# Patient Record
Sex: FEMALE | Race: BLACK OR AFRICAN AMERICAN | NOT HISPANIC OR LATINO | Employment: UNEMPLOYED | URBAN - METROPOLITAN AREA
[De-identification: names, ages, dates, MRNs, and addresses within clinical notes are randomized per-mention and may not be internally consistent; named-entity substitution may affect disease eponyms.]

---

## 2018-08-31 ENCOUNTER — HOSPITAL ENCOUNTER (OUTPATIENT)
Dept: RADIOLOGY | Facility: HOSPITAL | Age: 58
Discharge: HOME | End: 2018-08-31
Attending: FAMILY MEDICINE
Payer: COMMERCIAL

## 2018-08-31 ENCOUNTER — OFFICE VISIT (OUTPATIENT)
Dept: FAMILY MEDICINE | Facility: CLINIC | Age: 58
End: 2018-08-31
Payer: COMMERCIAL

## 2018-08-31 VITALS
OXYGEN SATURATION: 97 % | RESPIRATION RATE: 17 BRPM | HEIGHT: 67 IN | SYSTOLIC BLOOD PRESSURE: 126 MMHG | BODY MASS INDEX: 37.2 KG/M2 | DIASTOLIC BLOOD PRESSURE: 82 MMHG | HEART RATE: 60 BPM | WEIGHT: 237 LBS | TEMPERATURE: 98 F

## 2018-08-31 DIAGNOSIS — G89.29 CHRONIC FOOT PAIN, RIGHT: ICD-10-CM

## 2018-08-31 DIAGNOSIS — E66.9 OBESITY (BMI 30-39.9): ICD-10-CM

## 2018-08-31 DIAGNOSIS — Z78.0 POSTMENOPAUSAL: ICD-10-CM

## 2018-08-31 DIAGNOSIS — M79.671 CHRONIC FOOT PAIN, RIGHT: ICD-10-CM

## 2018-08-31 DIAGNOSIS — R53.83 FATIGUE, UNSPECIFIED TYPE: ICD-10-CM

## 2018-08-31 DIAGNOSIS — M25.471 ANKLE SWELLING, RIGHT: ICD-10-CM

## 2018-08-31 DIAGNOSIS — I10 HYPERTENSION, UNSPECIFIED TYPE: ICD-10-CM

## 2018-08-31 PROCEDURE — 3008F BODY MASS INDEX DOCD: CPT | Mod: CPTII,S$GLB,, | Performed by: FAMILY MEDICINE

## 2018-08-31 PROCEDURE — 99204 OFFICE O/P NEW MOD 45 MIN: CPT | Mod: S$GLB,,, | Performed by: FAMILY MEDICINE

## 2018-08-31 PROCEDURE — 99999 PR PBB SHADOW E&M-NEW PATIENT-LVL IV: CPT | Mod: PBBFAC,,, | Performed by: FAMILY MEDICINE

## 2018-08-31 PROCEDURE — 73630 X-RAY EXAM OF FOOT: CPT | Mod: 26,RT,, | Performed by: RADIOLOGY

## 2018-08-31 PROCEDURE — 73630 X-RAY EXAM OF FOOT: CPT | Mod: TC,FY,PO,RT

## 2018-08-31 RX ORDER — LOSARTAN POTASSIUM AND HYDROCHLOROTHIAZIDE 12.5; 5 MG/1; MG/1
1 TABLET ORAL DAILY
COMMUNITY
Start: 2018-08-13 | End: 2024-03-15 | Stop reason: ALTCHOICE

## 2018-08-31 NOTE — PROGRESS NOTES
Randi Khan    Chief Complaint   Patient presents with    Establish Care       History of Present Illness:   Ms. Khan comes in today to establish care with me.  She has taken medication today and is not fasting.  She states she has been previously followed by PCP Dr. Moris Mae with physical performed earlier this year.  She states she exercises 2 times a week and monitors her diet.  She states she tries to avoid eating carbs as she seems to get really tired after eating them.    She states she often awakens tired despite sleeping okay.    She reports having chronic right ankle and foot swelling which resolves with overnight elevation.  She also reports having chronic, occasional pain at her inner right ankle and bottom of her right foot.  She states she does not like to take medication for pain; she states she walks and symptoms improved.  She reports having trauma as a child; she states she wore clogs and remembers twisting her ankle.  Otherwise, she reports no new trauma but mentions in July 2018 while in [place] she walked a lot and subsequently had swelling, redness, and pain of her right great toe with gradual improvement noted.    Otherwise, she denies having fever, chills, appetite changes; shortness of breath, cough, wheezing; chest pain, palpitations, leg swelling; abdominal pain, nausea, vomiting, diarrhea, constipation; hot or cold intolerance; unusual urinary symptoms; back pain; headache; anxiety, depression, homicidal or suicidal thoughts.    Past Medical History:  No date: History of gastroesophageal reflux (GERD)  No date: History of positive PPD      Comment:  Not treated; Family history of TB  1998: HTN (hypertension)      Comment:  Diagnosed following pregnancy; follows with cardiology at [facility]  No date: Postmenopausal        Current Outpatient Medications   Medication Sig    losartan-hydrochlorothiazide 50-12.5 mg (HYZAAR) 50-12.5 mg per tablet Take 1 tablet by  mouth once daily.        Review of Systems   Constitutional: Positive for fatigue. Negative for activity change, appetite change, chills and fever.   Eyes:        Wears glasses.   Respiratory: Negative for cough, shortness of breath and wheezing.    Cardiovascular: Negative for chest pain, palpitations and leg swelling.   Gastrointestinal: Negative for abdominal pain, constipation, diarrhea, nausea and vomiting.   Endocrine: Negative for cold intolerance and heat intolerance.   Genitourinary: Negative for difficulty urinating.   Musculoskeletal: Positive for joint swelling and myalgias. Negative for back pain.        See history of present illness.   Neurological: Negative for headaches.   Psychiatric/Behavioral: Negative for dysphoric mood and suicidal ideas. The patient is not nervous/anxious.         Negative for homicidal ideas.       Objective:  Physical Exam   Constitutional: She is oriented to person, place, and time. She appears well-developed and well-nourished. No distress.   Pleasant.   Eyes:   She wears glasses.   Neck: Normal range of motion. Neck supple. No thyromegaly present.   Cardiovascular: Normal rate, regular rhythm, normal heart sounds and intact distal pulses.   No murmur heard.  Pulmonary/Chest: Effort normal and breath sounds normal. No stridor. No respiratory distress. She has no wheezes.   Abdominal: Soft. Bowel sounds are normal. She exhibits no distension and no mass. There is no tenderness. There is no guarding.   Musculoskeletal: Normal range of motion. She exhibits edema. She exhibits no tenderness.   She is ambulatory without problems. Negative Domingo's sign at both lower legs.  Trace edema at right ankle and foot without tenderness noted.   Lymphadenopathy:     She has no cervical adenopathy.   Neurological: She is alert and oriented to person, place, and time.   Skin: She is not diaphoretic.   Psychiatric: She has a normal mood and affect. Her behavior is normal. Judgment and  thought content normal.   Vitals reviewed.      ASSESSMENT:  1. Hypertension, unspecified type    2. Chronic foot pain, right    3. Ankle swelling, right    4. Fatigue, unspecified type    5. Postmenopausal    6. Obesity (BMI 30-39.9)        PLAN:  Randi was seen today for establish care.    Diagnoses and all orders for this visit:    Hypertension, unspecified type  -     Comprehensive metabolic panel; Future  -     Uric acid; Future  -     TSH; Future  -     CBC auto differential; Future  -     Hemoglobin A1c; Future    Chronic foot pain, right  -     X-Ray Foot Complete Right; Future  -     Uric acid; Future    Ankle swelling, right  -     X-Ray Foot Complete Right; Future  -     Uric acid; Future    Fatigue, unspecified type  -     Vitamin D; Future    Postmenopausal    Obesity (BMI 30-39.9)       Patient advised to call for results.  Continue current medications, follow low sodium, low cholesterol, low carb diet, daily walks.  Keep follow up with specialist.  Flu shot this fall.  Follow-up in about 8 months (around 4/30/2019) for physical.

## 2018-09-05 DIAGNOSIS — E79.0 HYPERURICEMIA: Primary | ICD-10-CM

## 2018-09-05 RX ORDER — ALLOPURINOL 100 MG/1
100 TABLET ORAL DAILY
Qty: 30 TABLET | Refills: 2 | Status: SHIPPED | OUTPATIENT
Start: 2018-09-05 | End: 2018-12-23 | Stop reason: SDUPTHER

## 2018-09-18 ENCOUNTER — PATIENT MESSAGE (OUTPATIENT)
Dept: FAMILY MEDICINE | Facility: CLINIC | Age: 58
End: 2018-09-18

## 2018-09-19 ENCOUNTER — TELEPHONE (OUTPATIENT)
Dept: FAMILY MEDICINE | Facility: CLINIC | Age: 58
End: 2018-09-19

## 2018-09-19 NOTE — TELEPHONE ENCOUNTER
I have a question about URIC ACID resulted on 8/31/18, 9:11 PM.     Could the high uric acid be a side effect of the Lasartan

## 2018-09-19 NOTE — TELEPHONE ENCOUNTER
Advise pt  The fluid portion part (hydrochlorothiazide) of blood pressure medication (Losartan-hydrochlorothiazide) may cause elevation of uric acid. If you desire blood pressure medication change, I recommend you continue Losartan and hydrochlorothiazide can be changed to another fluid medication which does not cause uric acid elevation. Whether or not you decide to take Allopurinol as I recommended, please make sure to call nurse for appointment with see me in 2 months as I need to recheck you.  Thanks.

## 2018-12-23 DIAGNOSIS — E79.0 HYPERURICEMIA: ICD-10-CM

## 2018-12-24 RX ORDER — ALLOPURINOL 100 MG/1
TABLET ORAL
Qty: 30 TABLET | Refills: 0 | Status: SHIPPED | OUTPATIENT
Start: 2018-12-24 | End: 2019-02-16 | Stop reason: SDUPTHER

## 2019-01-22 ENCOUNTER — OFFICE VISIT (OUTPATIENT)
Dept: FAMILY MEDICINE | Facility: CLINIC | Age: 59
End: 2019-01-22
Payer: COMMERCIAL

## 2019-01-22 VITALS
BODY MASS INDEX: 37.65 KG/M2 | HEART RATE: 65 BPM | WEIGHT: 239.88 LBS | RESPIRATION RATE: 16 BRPM | DIASTOLIC BLOOD PRESSURE: 80 MMHG | HEIGHT: 67 IN | OXYGEN SATURATION: 100 % | TEMPERATURE: 98 F | SYSTOLIC BLOOD PRESSURE: 134 MMHG

## 2019-01-22 DIAGNOSIS — R20.2 NUMBNESS AND TINGLING OF RIGHT UPPER EXTREMITY: ICD-10-CM

## 2019-01-22 DIAGNOSIS — R20.0 NUMBNESS AND TINGLING OF RIGHT UPPER EXTREMITY: ICD-10-CM

## 2019-01-22 DIAGNOSIS — M25.60 JOINT STIFFNESS: ICD-10-CM

## 2019-01-22 DIAGNOSIS — E55.9 VITAMIN D DEFICIENCY: ICD-10-CM

## 2019-01-22 DIAGNOSIS — E79.0 ELEVATED URIC ACID IN BLOOD: Primary | ICD-10-CM

## 2019-01-22 PROCEDURE — 3075F SYST BP GE 130 - 139MM HG: CPT | Mod: CPTII,S$GLB,, | Performed by: FAMILY MEDICINE

## 2019-01-22 PROCEDURE — 99214 OFFICE O/P EST MOD 30 MIN: CPT | Mod: S$GLB,,, | Performed by: FAMILY MEDICINE

## 2019-01-22 PROCEDURE — 99214 PR OFFICE/OUTPT VISIT, EST, LEVL IV, 30-39 MIN: ICD-10-PCS | Mod: S$GLB,,, | Performed by: FAMILY MEDICINE

## 2019-01-22 PROCEDURE — 99999 PR PBB SHADOW E&M-EST. PATIENT-LVL III: ICD-10-PCS | Mod: PBBFAC,,, | Performed by: FAMILY MEDICINE

## 2019-01-22 PROCEDURE — 99999 PR PBB SHADOW E&M-EST. PATIENT-LVL III: CPT | Mod: PBBFAC,,, | Performed by: FAMILY MEDICINE

## 2019-01-22 PROCEDURE — 3079F DIAST BP 80-89 MM HG: CPT | Mod: CPTII,S$GLB,, | Performed by: FAMILY MEDICINE

## 2019-01-22 PROCEDURE — 3008F BODY MASS INDEX DOCD: CPT | Mod: CPTII,S$GLB,, | Performed by: FAMILY MEDICINE

## 2019-01-22 PROCEDURE — 3079F PR MOST RECENT DIASTOLIC BLOOD PRESSURE 80-89 MM HG: ICD-10-PCS | Mod: CPTII,S$GLB,, | Performed by: FAMILY MEDICINE

## 2019-01-22 PROCEDURE — 3075F PR MOST RECENT SYSTOLIC BLOOD PRESS GE 130-139MM HG: ICD-10-PCS | Mod: CPTII,S$GLB,, | Performed by: FAMILY MEDICINE

## 2019-01-22 PROCEDURE — 3008F PR BODY MASS INDEX (BMI) DOCUMENTED: ICD-10-PCS | Mod: CPTII,S$GLB,, | Performed by: FAMILY MEDICINE

## 2019-01-22 RX ORDER — CHOLECALCIFEROL (VITAMIN D3) 50 MCG
TABLET ORAL 2 TIMES DAILY
COMMUNITY
End: 2023-10-03
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
## 2019-01-24 ENCOUNTER — TELEPHONE (OUTPATIENT)
Dept: FAMILY MEDICINE | Facility: CLINIC | Age: 59
End: 2019-01-24

## 2019-01-24 ENCOUNTER — APPOINTMENT (OUTPATIENT)
Dept: LAB | Facility: HOSPITAL | Age: 59
End: 2019-01-24
Attending: FAMILY MEDICINE
Payer: COMMERCIAL

## 2019-01-24 DIAGNOSIS — M25.60 JOINT STIFFNESS: ICD-10-CM

## 2019-01-24 DIAGNOSIS — R20.2 NUMBNESS AND TINGLING OF RIGHT UPPER EXTREMITY: ICD-10-CM

## 2019-01-24 DIAGNOSIS — E79.0 ELEVATED URIC ACID IN BLOOD: Primary | ICD-10-CM

## 2019-01-24 DIAGNOSIS — R20.0 NUMBNESS AND TINGLING OF RIGHT UPPER EXTREMITY: ICD-10-CM

## 2019-01-24 DIAGNOSIS — E55.9 VITAMIN D DEFICIENCY: ICD-10-CM

## 2019-02-13 ENCOUNTER — PATIENT MESSAGE (OUTPATIENT)
Dept: FAMILY MEDICINE | Facility: CLINIC | Age: 59
End: 2019-02-13

## 2019-02-16 DIAGNOSIS — E79.0 HYPERURICEMIA: ICD-10-CM

## 2019-02-18 RX ORDER — ALLOPURINOL 100 MG/1
TABLET ORAL
Qty: 30 TABLET | Refills: 5 | Status: SHIPPED | OUTPATIENT
Start: 2019-02-18 | End: 2019-10-01 | Stop reason: SDUPTHER

## 2019-02-23 ENCOUNTER — PATIENT MESSAGE (OUTPATIENT)
Dept: FAMILY MEDICINE | Facility: CLINIC | Age: 59
End: 2019-02-23

## 2019-05-24 ENCOUNTER — OFFICE VISIT (OUTPATIENT)
Dept: FAMILY MEDICINE | Facility: CLINIC | Age: 59
End: 2019-05-24
Payer: COMMERCIAL

## 2019-05-24 VITALS
RESPIRATION RATE: 17 BRPM | WEIGHT: 243.19 LBS | HEART RATE: 74 BPM | OXYGEN SATURATION: 98 % | BODY MASS INDEX: 38.17 KG/M2 | TEMPERATURE: 97 F | HEIGHT: 67 IN | SYSTOLIC BLOOD PRESSURE: 128 MMHG | DIASTOLIC BLOOD PRESSURE: 82 MMHG

## 2019-05-24 DIAGNOSIS — E66.9 OBESITY (BMI 30-39.9): ICD-10-CM

## 2019-05-24 DIAGNOSIS — E55.9 VITAMIN D DEFICIENCY: ICD-10-CM

## 2019-05-24 DIAGNOSIS — M19.079 ARTHRITIS OF FOOT: ICD-10-CM

## 2019-05-24 DIAGNOSIS — Z12.31 VISIT FOR SCREENING MAMMOGRAM: ICD-10-CM

## 2019-05-24 DIAGNOSIS — Z78.0 POSTMENOPAUSAL: ICD-10-CM

## 2019-05-24 DIAGNOSIS — Z12.11 SCREENING FOR COLON CANCER: ICD-10-CM

## 2019-05-24 DIAGNOSIS — Z00.00 ANNUAL PHYSICAL EXAM: Primary | ICD-10-CM

## 2019-05-24 DIAGNOSIS — I10 HYPERTENSION, UNSPECIFIED TYPE: ICD-10-CM

## 2019-05-24 LAB
BACTERIA #/AREA URNS AUTO: ABNORMAL /HPF
BILIRUB UR QL STRIP: NEGATIVE
CLARITY UR REFRACT.AUTO: CLEAR
COLOR UR AUTO: YELLOW
GLUCOSE UR QL STRIP: NEGATIVE
HGB UR QL STRIP: NEGATIVE
KETONES UR QL STRIP: NEGATIVE
LEUKOCYTE ESTERASE UR QL STRIP: ABNORMAL
MICROSCOPIC COMMENT: ABNORMAL
NITRITE UR QL STRIP: NEGATIVE
PH UR STRIP: 7 [PH] (ref 5–8)
PROT UR QL STRIP: NEGATIVE
RBC #/AREA URNS AUTO: 0 /HPF (ref 0–4)
SP GR UR STRIP: 1.01 (ref 1–1.03)
SQUAMOUS #/AREA URNS AUTO: 1 /HPF
URN SPEC COLLECT METH UR: ABNORMAL
WBC #/AREA URNS AUTO: 11 /HPF (ref 0–5)

## 2019-05-24 PROCEDURE — 3079F DIAST BP 80-89 MM HG: CPT | Mod: CPTII,S$GLB,, | Performed by: FAMILY MEDICINE

## 2019-05-24 PROCEDURE — 3079F PR MOST RECENT DIASTOLIC BLOOD PRESSURE 80-89 MM HG: ICD-10-PCS | Mod: CPTII,S$GLB,, | Performed by: FAMILY MEDICINE

## 2019-05-24 PROCEDURE — 99396 PREV VISIT EST AGE 40-64: CPT | Mod: S$GLB,,, | Performed by: FAMILY MEDICINE

## 2019-05-24 PROCEDURE — 99999 PR PBB SHADOW E&M-EST. PATIENT-LVL IV: ICD-10-PCS | Mod: PBBFAC,,, | Performed by: FAMILY MEDICINE

## 2019-05-24 PROCEDURE — 99999 PR PBB SHADOW E&M-EST. PATIENT-LVL IV: CPT | Mod: PBBFAC,,, | Performed by: FAMILY MEDICINE

## 2019-05-24 PROCEDURE — 99396 PR PREVENTIVE VISIT,EST,40-64: ICD-10-PCS | Mod: S$GLB,,, | Performed by: FAMILY MEDICINE

## 2019-05-24 PROCEDURE — 88175 CYTOPATH C/V AUTO FLUID REDO: CPT

## 2019-05-24 PROCEDURE — 3074F SYST BP LT 130 MM HG: CPT | Mod: CPTII,S$GLB,, | Performed by: FAMILY MEDICINE

## 2019-05-24 PROCEDURE — 81001 URINALYSIS AUTO W/SCOPE: CPT

## 2019-05-24 PROCEDURE — 3074F PR MOST RECENT SYSTOLIC BLOOD PRESSURE < 130 MM HG: ICD-10-PCS | Mod: CPTII,S$GLB,, | Performed by: FAMILY MEDICINE

## 2019-05-27 ENCOUNTER — TELEPHONE (OUTPATIENT)
Dept: GASTROENTEROLOGY | Facility: CLINIC | Age: 59
End: 2019-05-27

## 2019-05-27 ENCOUNTER — LAB VISIT (OUTPATIENT)
Dept: LAB | Facility: HOSPITAL | Age: 59
End: 2019-05-27
Attending: FAMILY MEDICINE
Payer: COMMERCIAL

## 2019-05-27 DIAGNOSIS — Z00.00 ANNUAL PHYSICAL EXAM: ICD-10-CM

## 2019-05-27 LAB
25(OH)D3+25(OH)D2 SERPL-MCNC: 22 NG/ML (ref 30–96)
ALBUMIN SERPL BCP-MCNC: 3.8 G/DL (ref 3.5–5.2)
ALP SERPL-CCNC: 89 U/L (ref 55–135)
ALT SERPL W/O P-5'-P-CCNC: 24 U/L (ref 10–44)
ANION GAP SERPL CALC-SCNC: 9 MMOL/L (ref 8–16)
AST SERPL-CCNC: 21 U/L (ref 10–40)
BASOPHILS # BLD AUTO: 0.03 K/UL (ref 0–0.2)
BASOPHILS NFR BLD: 0.5 % (ref 0–1.9)
BILIRUB SERPL-MCNC: 0.4 MG/DL (ref 0.1–1)
BUN SERPL-MCNC: 18 MG/DL (ref 6–20)
CALCIUM SERPL-MCNC: 10.5 MG/DL (ref 8.7–10.5)
CHLORIDE SERPL-SCNC: 107 MMOL/L (ref 95–110)
CHOLEST SERPL-MCNC: 111 MG/DL (ref 120–199)
CHOLEST/HDLC SERPL: 2.4 {RATIO} (ref 2–5)
CO2 SERPL-SCNC: 25 MMOL/L (ref 23–29)
CREAT SERPL-MCNC: 1 MG/DL (ref 0.5–1.4)
DIFFERENTIAL METHOD: ABNORMAL
EOSINOPHIL # BLD AUTO: 0.2 K/UL (ref 0–0.5)
EOSINOPHIL NFR BLD: 3.7 % (ref 0–8)
ERYTHROCYTE [DISTWIDTH] IN BLOOD BY AUTOMATED COUNT: 13.1 % (ref 11.5–14.5)
EST. GFR  (AFRICAN AMERICAN): >60 ML/MIN/1.73 M^2
EST. GFR  (NON AFRICAN AMERICAN): >60 ML/MIN/1.73 M^2
GLUCOSE SERPL-MCNC: 75 MG/DL (ref 70–110)
HCT VFR BLD AUTO: 36.3 % (ref 37–48.5)
HDLC SERPL-MCNC: 47 MG/DL (ref 40–75)
HDLC SERPL: 42.3 % (ref 20–50)
HGB BLD-MCNC: 11.1 G/DL (ref 12–16)
IMM GRANULOCYTES # BLD AUTO: 0.02 K/UL (ref 0–0.04)
IMM GRANULOCYTES NFR BLD AUTO: 0.3 % (ref 0–0.5)
LDLC SERPL CALC-MCNC: 55.6 MG/DL (ref 63–159)
LYMPHOCYTES # BLD AUTO: 3.9 K/UL (ref 1–4.8)
LYMPHOCYTES NFR BLD: 60.2 % (ref 18–48)
MCH RBC QN AUTO: 29.8 PG (ref 27–31)
MCHC RBC AUTO-ENTMCNC: 30.6 G/DL (ref 32–36)
MCV RBC AUTO: 98 FL (ref 82–98)
MONOCYTES # BLD AUTO: 0.5 K/UL (ref 0.3–1)
MONOCYTES NFR BLD: 7.3 % (ref 4–15)
NEUTROPHILS # BLD AUTO: 1.8 K/UL (ref 1.8–7.7)
NEUTROPHILS NFR BLD: 28 % (ref 38–73)
NONHDLC SERPL-MCNC: 64 MG/DL
NRBC BLD-RTO: 0 /100 WBC
PLATELET # BLD AUTO: 221 K/UL (ref 150–350)
PMV BLD AUTO: 10.7 FL (ref 9.2–12.9)
POTASSIUM SERPL-SCNC: 4.4 MMOL/L (ref 3.5–5.1)
PROT SERPL-MCNC: 6.8 G/DL (ref 6–8.4)
RBC # BLD AUTO: 3.72 M/UL (ref 4–5.4)
SODIUM SERPL-SCNC: 141 MMOL/L (ref 136–145)
TRIGL SERPL-MCNC: 42 MG/DL (ref 30–150)
TSH SERPL DL<=0.005 MIU/L-ACNC: 1.25 UIU/ML (ref 0.4–4)
WBC # BLD AUTO: 6.48 K/UL (ref 3.9–12.7)

## 2019-05-27 PROCEDURE — 86803 HEPATITIS C AB TEST: CPT

## 2019-05-27 PROCEDURE — 84443 ASSAY THYROID STIM HORMONE: CPT

## 2019-05-27 PROCEDURE — 36415 COLL VENOUS BLD VENIPUNCTURE: CPT | Mod: PO

## 2019-05-27 PROCEDURE — 80053 COMPREHEN METABOLIC PANEL: CPT

## 2019-05-27 PROCEDURE — 82306 VITAMIN D 25 HYDROXY: CPT

## 2019-05-27 PROCEDURE — 85025 COMPLETE CBC W/AUTO DIFF WBC: CPT

## 2019-05-27 PROCEDURE — 80061 LIPID PANEL: CPT

## 2019-05-28 LAB — HCV AB SERPL QL IA: NEGATIVE

## 2019-06-11 DIAGNOSIS — N39.0 URINARY TRACT INFECTION WITHOUT HEMATURIA, SITE UNSPECIFIED: Primary | ICD-10-CM

## 2019-06-11 RX ORDER — SULFAMETHOXAZOLE AND TRIMETHOPRIM 800; 160 MG/1; MG/1
1 TABLET ORAL 2 TIMES DAILY
Qty: 10 TABLET | Refills: 0 | Status: SHIPPED | OUTPATIENT
Start: 2019-06-11 | End: 2019-06-16

## 2019-09-23 ENCOUNTER — TELEPHONE (OUTPATIENT)
Dept: ENDOSCOPY | Facility: HOSPITAL | Age: 59
End: 2019-09-23

## 2019-09-23 NOTE — TELEPHONE ENCOUNTER
Attempted to schedule procedure with patient, no answer and unable to leave a message per recording.

## 2019-10-01 DIAGNOSIS — E79.0 HYPERURICEMIA: ICD-10-CM

## 2019-10-01 RX ORDER — ALLOPURINOL 100 MG/1
TABLET ORAL
Qty: 30 TABLET | Refills: 5 | Status: SHIPPED | OUTPATIENT
Start: 2019-10-01 | End: 2020-06-19

## 2019-10-23 ENCOUNTER — TELEPHONE (OUTPATIENT)
Dept: ENDOSCOPY | Facility: HOSPITAL | Age: 59
End: 2019-10-23

## 2019-11-25 ENCOUNTER — OFFICE VISIT (OUTPATIENT)
Dept: FAMILY MEDICINE | Facility: CLINIC | Age: 59
End: 2019-11-25
Payer: COMMERCIAL

## 2019-11-25 VITALS
TEMPERATURE: 98 F | HEIGHT: 67 IN | RESPIRATION RATE: 16 BRPM | SYSTOLIC BLOOD PRESSURE: 139 MMHG | BODY MASS INDEX: 37.82 KG/M2 | WEIGHT: 240.94 LBS | HEART RATE: 60 BPM | DIASTOLIC BLOOD PRESSURE: 77 MMHG

## 2019-11-25 DIAGNOSIS — E66.9 OBESITY (BMI 30-39.9): ICD-10-CM

## 2019-11-25 DIAGNOSIS — E55.9 VITAMIN D DEFICIENCY: ICD-10-CM

## 2019-11-25 DIAGNOSIS — I10 HYPERTENSION, UNSPECIFIED TYPE: Primary | ICD-10-CM

## 2019-11-25 PROCEDURE — 99999 PR PBB SHADOW E&M-EST. PATIENT-LVL III: ICD-10-PCS | Mod: PBBFAC,,, | Performed by: FAMILY MEDICINE

## 2019-11-25 PROCEDURE — 99214 PR OFFICE/OUTPT VISIT, EST, LEVL IV, 30-39 MIN: ICD-10-PCS | Mod: S$GLB,,, | Performed by: FAMILY MEDICINE

## 2019-11-25 PROCEDURE — 99214 OFFICE O/P EST MOD 30 MIN: CPT | Mod: S$GLB,,, | Performed by: FAMILY MEDICINE

## 2019-11-25 PROCEDURE — 3075F SYST BP GE 130 - 139MM HG: CPT | Mod: CPTII,S$GLB,, | Performed by: FAMILY MEDICINE

## 2019-11-25 PROCEDURE — 3078F PR MOST RECENT DIASTOLIC BLOOD PRESSURE < 80 MM HG: ICD-10-PCS | Mod: CPTII,S$GLB,, | Performed by: FAMILY MEDICINE

## 2019-11-25 PROCEDURE — 3008F BODY MASS INDEX DOCD: CPT | Mod: CPTII,S$GLB,, | Performed by: FAMILY MEDICINE

## 2019-11-25 PROCEDURE — 3078F DIAST BP <80 MM HG: CPT | Mod: CPTII,S$GLB,, | Performed by: FAMILY MEDICINE

## 2019-11-25 PROCEDURE — 3075F PR MOST RECENT SYSTOLIC BLOOD PRESS GE 130-139MM HG: ICD-10-PCS | Mod: CPTII,S$GLB,, | Performed by: FAMILY MEDICINE

## 2019-11-25 PROCEDURE — 3008F PR BODY MASS INDEX (BMI) DOCUMENTED: ICD-10-PCS | Mod: CPTII,S$GLB,, | Performed by: FAMILY MEDICINE

## 2019-11-25 PROCEDURE — 99999 PR PBB SHADOW E&M-EST. PATIENT-LVL III: CPT | Mod: PBBFAC,,, | Performed by: FAMILY MEDICINE

## 2019-11-25 NOTE — PROGRESS NOTES
Randi Khan    Chief Complaint   Patient presents with    Hypertension    Follow-up       History of Present Illness:   Ms. Khan comes in today for 6-month hypertension follow-up.  She has taken medication today and is not fasting.  She states she does not perform home blood pressure checks.  She states she sometimes monitors her diet but has not been regularly exercising.    She reports having occasional hand numbness, tingling with cold fingers but states she warms her fingers up and they feel much better.    She states she has intermittent swelling and pain at right great toe with only slight swelling without pain today; she states she takes Allopurinol with help.    Otherwise, she states she feels okay today and denies having fever, chills, fatigue, appetite changes; shortness of breath, cough, wheezing; chest pain, palpitations, leg swelling; abdominal pain, nausea, vomiting, diarrhea, constipation; unusual urinary symptoms; back pain; headache; anxiety, depression, homicidal or suicidal thoughts.    She states she follows with cardiologist Dr. Traci Lombardo every 6 to 8 months for hypertension follow up.    Labs:              WBC                      6.48                05/27/2019                 HGB                      11.1 (L)            05/27/2019                 HCT                      36.3 (L)            05/27/2019                 PLT                      221                 05/27/2019                 CHOL                     111 (L)             05/27/2019                 TRIG                     42                  05/27/2019                 HDL                      47                  05/27/2019                 ALT                      24                  05/27/2019                 AST                      21                  05/27/2019                 NA                       141                 05/27/2019                 K                        4.4                 05/27/2019                  CL                       107                 05/27/2019                 CREATININE               1.0                 05/27/2019                 BUN                      18                  05/27/2019                 CO2                      25                  05/27/2019                 TSH                      1.252               05/27/2019                 HGBA1C                   5.6                 08/31/2018                LDLCALC                  55.6 (L)            05/27/2019            Vit D, 25-Hydroxy             22         05/27/2019       JOSE A Screen                     Negative <1:160    01/24/2019          Current Outpatient Medications   Medication Sig    allopurinol (ZYLOPRIM) 100 MG tablet TAKE 1 TABLET(100 MG) BY MOUTH EVERY DAY    cholecalciferol, vitamin D3, (VITAMIN D3) 2,000 unit Tab Take by mouth once daily.    losartan-hydrochlorothiazide 50-12.5 mg (HYZAAR) 50-12.5 mg per tablet Take 1 tablet by mouth once daily.        Review of Systems   Constitutional: Negative for activity change, appetite change, chills, fatigue and fever.        Weight 110.3 kg (243 lb 2.7 oz) at May 24, 2019 visit.   Eyes:        Wears glasses.   Respiratory: Negative for cough, shortness of breath and wheezing.    Cardiovascular: Negative for chest pain, palpitations and leg swelling.        See history of present illness.   Gastrointestinal: Negative for abdominal pain, constipation, diarrhea, nausea and vomiting.   Endocrine: Negative for cold intolerance and heat intolerance.   Genitourinary: Negative for difficulty urinating.   Musculoskeletal: Positive for joint swelling. Negative for back pain and myalgias.        See history of present illness.   Neurological: Positive for numbness. Negative for headaches.   Psychiatric/Behavioral: Negative for dysphoric mood and suicidal ideas. The patient is not nervous/anxious.         Negative for homicidal ideas.       Objective:  Physical Exam   Constitutional: She is  oriented to person, place, and time. She appears well-developed and well-nourished. No distress.   Pleasant.   Eyes:   She wears glasses.   Neck: Normal range of motion. Neck supple. No thyromegaly present.   Cardiovascular: Normal rate, regular rhythm, normal heart sounds and intact distal pulses.   No murmur heard.  Pulmonary/Chest: Effort normal and breath sounds normal. No stridor. No respiratory distress. She has no wheezes.   Abdominal: Soft. Bowel sounds are normal. She exhibits no distension and no mass. There is no tenderness. There is no guarding.   Musculoskeletal: Normal range of motion. She exhibits no edema or tenderness.   She is ambulatory without problems.    Lymphadenopathy:     She has no cervical adenopathy.   Neurological: She is alert and oriented to person, place, and time.   Skin: She is not diaphoretic.   Psychiatric: She has a normal mood and affect. Her behavior is normal. Judgment and thought content normal.   Vitals reviewed.      ASSESSMENT:  1. Hypertension, unspecified type    2. Vitamin D deficiency    3. Obesity (BMI 30-39.9)        PLAN:  Randi was seen today for hypertension and follow-up.    Diagnoses and all orders for this visit:    Hypertension, unspecified type    Vitamin D deficiency    Obesity (BMI 30-39.9)       Continue current medications, follow low sodium, low cholesterol, low carb diet, daily walks.  Keep follow up with specialists.  I discussed with patient possibility she has Raynaud's; however, she declines medication but states she will continue symptomatic-conservative treatment (wear gloves).  Patient declines flu shot today.  Follow up in about 6 months (around 6/2/2020) for physical.

## 2020-02-28 ENCOUNTER — PATIENT MESSAGE (OUTPATIENT)
Dept: FAMILY MEDICINE | Facility: CLINIC | Age: 60
End: 2020-02-28

## 2020-03-01 ENCOUNTER — PATIENT MESSAGE (OUTPATIENT)
Dept: FAMILY MEDICINE | Facility: CLINIC | Age: 60
End: 2020-03-01

## 2020-03-03 ENCOUNTER — OFFICE VISIT (OUTPATIENT)
Dept: FAMILY MEDICINE | Facility: CLINIC | Age: 60
End: 2020-03-03
Payer: COMMERCIAL

## 2020-03-03 VITALS
HEART RATE: 68 BPM | HEIGHT: 67 IN | SYSTOLIC BLOOD PRESSURE: 138 MMHG | DIASTOLIC BLOOD PRESSURE: 76 MMHG | WEIGHT: 229.75 LBS | TEMPERATURE: 98 F | RESPIRATION RATE: 16 BRPM | BODY MASS INDEX: 36.06 KG/M2 | OXYGEN SATURATION: 94 %

## 2020-03-03 DIAGNOSIS — I10 HYPERTENSION, UNSPECIFIED TYPE: ICD-10-CM

## 2020-03-03 DIAGNOSIS — F32.A ANXIETY AND DEPRESSION: Primary | ICD-10-CM

## 2020-03-03 DIAGNOSIS — Z78.0 POSTMENOPAUSAL: ICD-10-CM

## 2020-03-03 DIAGNOSIS — F41.9 ANXIETY AND DEPRESSION: Primary | ICD-10-CM

## 2020-03-03 PROCEDURE — 3008F PR BODY MASS INDEX (BMI) DOCUMENTED: ICD-10-PCS | Mod: CPTII,S$GLB,, | Performed by: FAMILY MEDICINE

## 2020-03-03 PROCEDURE — 99999 PR PBB SHADOW E&M-EST. PATIENT-LVL III: ICD-10-PCS | Mod: PBBFAC,,, | Performed by: FAMILY MEDICINE

## 2020-03-03 PROCEDURE — 99214 PR OFFICE/OUTPT VISIT, EST, LEVL IV, 30-39 MIN: ICD-10-PCS | Mod: S$GLB,,, | Performed by: FAMILY MEDICINE

## 2020-03-03 PROCEDURE — 3075F PR MOST RECENT SYSTOLIC BLOOD PRESS GE 130-139MM HG: ICD-10-PCS | Mod: CPTII,S$GLB,, | Performed by: FAMILY MEDICINE

## 2020-03-03 PROCEDURE — 99214 OFFICE O/P EST MOD 30 MIN: CPT | Mod: S$GLB,,, | Performed by: FAMILY MEDICINE

## 2020-03-03 PROCEDURE — 3078F DIAST BP <80 MM HG: CPT | Mod: CPTII,S$GLB,, | Performed by: FAMILY MEDICINE

## 2020-03-03 PROCEDURE — 3075F SYST BP GE 130 - 139MM HG: CPT | Mod: CPTII,S$GLB,, | Performed by: FAMILY MEDICINE

## 2020-03-03 PROCEDURE — 99999 PR PBB SHADOW E&M-EST. PATIENT-LVL III: CPT | Mod: PBBFAC,,, | Performed by: FAMILY MEDICINE

## 2020-03-03 PROCEDURE — 3078F PR MOST RECENT DIASTOLIC BLOOD PRESSURE < 80 MM HG: ICD-10-PCS | Mod: CPTII,S$GLB,, | Performed by: FAMILY MEDICINE

## 2020-03-03 PROCEDURE — 3008F BODY MASS INDEX DOCD: CPT | Mod: CPTII,S$GLB,, | Performed by: FAMILY MEDICINE

## 2020-03-03 RX ORDER — VENLAFAXINE HYDROCHLORIDE 75 MG/1
75 CAPSULE, EXTENDED RELEASE ORAL DAILY
Qty: 30 CAPSULE | Refills: 2 | Status: SHIPPED | OUTPATIENT
Start: 2020-03-03 | End: 2020-03-23 | Stop reason: ALTCHOICE

## 2020-03-03 NOTE — PROGRESS NOTES
Randi Khan    Chief Complaint   Patient presents with    Mood changes       History of Present Illness:   Ms. Khan comes in today stating for the last few months (since August 2019) she has been experiencing fluctuating moods (down been normal) which she states is unusual for her.  She states she sometimes cries for no reason.  She states her thoughts are scattered and states she is not able to get things done.  She states she occasionally feels depressed and anxious and occasionally has insomnia sleeping only 3-4 hours a night.  She reports having occasional fatigue.  She works as a  at [employer] and states she recently got bad reviews, which is new for her.  Otherwise, she denies having suicidal or homicidal thoughts; fever, chills, appetite changes; shortness of breath, cough, wheezing; chest pain, palpitations, leg swelling; abdominal pain, nausea vomiting, diarrhea constipation; unusual urinary symptoms; back pain; headaches.  She reports no prior history of depression or anxiety.  She states she occasionally drinks alcohol but denies tobacco or illicit drug use.  She states she has not been exercising due to feet issues; however, she states she monitors her diet.    Labs:                    WBC                      6.48                05/27/2019                 HGB                      11.1 (L)            05/27/2019                 HCT                      36.3 (L)            05/27/2019                 PLT                      221                 05/27/2019                 CHOL                     111 (L)             05/27/2019                 TRIG                     42                  05/27/2019                 HDL                      47                  05/27/2019                 ALT                      24                  05/27/2019                 AST                      21                  05/27/2019                 NA                       141                 05/27/2019                 K                         4.4                 05/27/2019                 CL                       107                 05/27/2019                 CREATININE               1.0                 05/27/2019                 BUN                      18                  05/27/2019                 CO2                      25                  05/27/2019                 TSH                      1.252               05/27/2019                 HGBA1C                   5.6                 08/31/2018               LDLCALC                  55.6 (L)            05/27/2019                Current Outpatient Medications   Medication Sig    allopurinol (ZYLOPRIM) 100 MG tablet TAKE 1 TABLET(100 MG) BY MOUTH EVERY DAY    cholecalciferol, vitamin D3, (VITAMIN D3) 2,000 unit Tab Take by mouth 2 (two) times daily.     losartan-hydrochlorothiazide 50-12.5 mg (HYZAAR) 50-12.5 mg per tablet Take 1 tablet by mouth once daily.        Review of Systems   Constitutional: Positive for fatigue. Negative for activity change, appetite change, chills and fever.   Respiratory: Negative for cough, shortness of breath and wheezing.    Cardiovascular: Negative for chest pain, palpitations and leg swelling.   Gastrointestinal: Negative for abdominal pain, constipation, diarrhea, nausea and vomiting.   Genitourinary: Negative for difficulty urinating.   Musculoskeletal: Negative for back pain.   Neurological: Negative for headaches.   Psychiatric/Behavioral: Positive for dysphoric mood and sleep disturbance. Negative for suicidal ideas. The patient is nervous/anxious.         Negative for homicidal ideas. See history of present illness.       Objective:  Physical Exam   Constitutional: She is oriented to person, place, and time. She appears well-developed and well-nourished. No distress.   Pleasant.   Eyes:   She wears glasses.   Neck: Normal range of motion. Neck supple. No thyromegaly present.   Cardiovascular: Normal rate, regular rhythm, normal heart sounds  and intact distal pulses.   No murmur heard.  Pulmonary/Chest: Effort normal and breath sounds normal. No stridor. No respiratory distress. She has no wheezes.   Abdominal: Soft. Bowel sounds are normal. She exhibits no distension and no mass. There is no tenderness. There is no guarding.   Musculoskeletal: Normal range of motion. She exhibits no edema or tenderness.   She is ambulatory without problems.    Lymphadenopathy:     She has no cervical adenopathy.   Neurological: She is alert and oriented to person, place, and time.   Skin: She is not diaphoretic.   Slightly loosened right toenail without drainage noted.   Psychiatric: She has a normal mood and affect. Her behavior is normal. Judgment and thought content normal.   Vitals reviewed.      ASSESSMENT:  1. Anxiety and depression    2. Hypertension, unspecified type    3. Postmenopausal        PLAN:  Randi was seen today for mood changes.    Diagnoses and all orders for this visit:    Anxiety and depression  -     venlafaxine (EFFEXOR-XR) 75 MG 24 hr capsule; Take 1 capsule (75 mg total) by mouth once daily.    Hypertension, unspecified type    Postmenopausal    Add Effexor XR 75 mg daily as directed; medication precautions discussed with patient.  Continue current medications, follow low sodium, low cholesterol, low carb diet, daily walks.  I advised patient to text me in 1 month with status check.  Keep 6/2/2020 scheduled physical with me; but, Follow up if symptoms worsen or fail to improve.   Total visit time 25 minutes with >50% of time spent in discussion and counseling as noted above.

## 2020-03-08 PROBLEM — F41.9 ANXIETY AND DEPRESSION: Status: ACTIVE | Noted: 2020-03-08

## 2020-03-08 PROBLEM — F32.A ANXIETY AND DEPRESSION: Status: ACTIVE | Noted: 2020-03-08

## 2020-03-23 ENCOUNTER — PATIENT MESSAGE (OUTPATIENT)
Dept: FAMILY MEDICINE | Facility: CLINIC | Age: 60
End: 2020-03-23

## 2020-03-23 DIAGNOSIS — F41.9 ANXIETY AND DEPRESSION: Primary | ICD-10-CM

## 2020-03-23 DIAGNOSIS — F32.A ANXIETY AND DEPRESSION: Primary | ICD-10-CM

## 2020-03-23 RX ORDER — VENLAFAXINE 37.5 MG/1
37.5 TABLET ORAL 2 TIMES DAILY
Qty: 60 TABLET | Refills: 2 | Status: SHIPPED | OUTPATIENT
Start: 2020-03-23 | End: 2023-10-03

## 2021-03-08 ENCOUNTER — PATIENT OUTREACH (OUTPATIENT)
Dept: ADMINISTRATIVE | Facility: HOSPITAL | Age: 61
End: 2021-03-08

## 2021-03-24 ENCOUNTER — PATIENT OUTREACH (OUTPATIENT)
Dept: ADMINISTRATIVE | Facility: HOSPITAL | Age: 61
End: 2021-03-24

## 2021-04-28 ENCOUNTER — PATIENT MESSAGE (OUTPATIENT)
Dept: RESEARCH | Facility: HOSPITAL | Age: 61
End: 2021-04-28

## 2021-10-07 ENCOUNTER — PATIENT MESSAGE (OUTPATIENT)
Dept: ADMINISTRATIVE | Facility: HOSPITAL | Age: 61
End: 2021-10-07

## 2021-10-07 ENCOUNTER — TELEPHONE (OUTPATIENT)
Dept: FAMILY MEDICINE | Facility: CLINIC | Age: 61
End: 2021-10-07

## 2021-10-07 ENCOUNTER — PATIENT OUTREACH (OUTPATIENT)
Dept: ADMINISTRATIVE | Facility: HOSPITAL | Age: 61
End: 2021-10-07

## 2021-10-28 ENCOUNTER — PATIENT OUTREACH (OUTPATIENT)
Dept: ADMINISTRATIVE | Facility: HOSPITAL | Age: 61
End: 2021-10-28
Payer: COMMERCIAL

## 2023-02-14 ENCOUNTER — IMMUNIZATION (OUTPATIENT)
Dept: PRIMARY CARE CLINIC | Facility: CLINIC | Age: 63
End: 2023-02-14
Payer: COMMERCIAL

## 2023-02-14 DIAGNOSIS — Z23 NEED FOR VACCINATION: Primary | ICD-10-CM

## 2023-02-14 PROCEDURE — 0124A COVID-19, MRNA, LNP-S, BIVALENT BOOSTER, PF, 30 MCG/0.3 ML DOSE: CPT | Mod: CV19,PBBFAC | Performed by: FAMILY MEDICINE

## 2023-02-14 PROCEDURE — 91312 COVID-19, MRNA, LNP-S, BIVALENT BOOSTER, PF, 30 MCG/0.3 ML DOSE: CPT | Mod: PBBFAC | Performed by: FAMILY MEDICINE

## 2023-07-12 NOTE — PROGRESS NOTES
HISTORY OF PRESENT ILLNESS: Ms. Khan comes in today not fasting but with taking medication for annual wellness examination. She reports no acute problems today.     END OF LIFE DECISION: She does not have a living will. She does desire life support temporarily.    Current Outpatient Medications   Medication Sig    allopurinol (ZYLOPRIM) 100 MG tablet TAKE 1 TABLET(100 MG) BY MOUTH EVERY DAY    cholecalciferol, vitamin D3, (VITAMIN D3) 2,000 unit Tab Take by mouth once daily.    losartan-hydrochlorothiazide 50-12.5 mg (HYZAAR) 50-12.5 mg per tablet Take 1 tablet by mouth once daily.      SCREENINGS:    LDLCALC: January 2019 with [employer] at work.  Mammogram: 2017 per patient. She desires at [facility].  Colonoscopy: Never. She desires.  Dexa Scan: Never.   GYN Exam: > 3 years ago per patient. She desires.  Eye Exam: 2018 per patient. She wears glasses.  PPD: Positive in the past.      Immunizations:    Tdap: > 10 years ago per patient. She declines.  Flu shot: In the past. She declines.  Shingrix: Never. Reports history of varicella not zoster. She declines.                                        ROS:  GENERAL: Denies fever, chills or unusual weight change. Appetite normal. Exercises does not. Monitors diet does. Reports always tired. Weight 108.8 kg (239 lb 13.8 oz) at January 22, 2019 visit.  SKIN: Denies rashes, itching, changes in mole, color or texture of skin or easy bruising.  HEAD:  Denies headaches or recent head trauma.  EYES: Denies change in vision, pain, diplopia, redness or discharge. She wears glasses.  EARS: Denies ear pain, discharge, vertigo or decreased hearing.  NOSE: Denies loss of smell, epistaxis or rhinitis.  MOUTH & THROAT: Denies hoarseness or change in voice. Denies excessive gum bleeding or mouth sores.  Denies sore throat.  NODES: Denies swollen glands.  CHEST: Denies GUERRA, wheezing, cough, or sputum production.  CARDIOVASCULAR: Denies chest pain, PND, orthopnea or  "reduced exercise tolerance.  Denies palpitations.  ABDOMEN: Denies diarrhea, constipation, nausea, vomiting, abdominal pain, or blood in stool.  URINARY: Denies flank pain, dysuria or hematuria.  GENITOURINARY: Denies flank pain, dysuria, frequency or hematuria. Performs monthly breast exams does.  ENDOCRINE: Denies thyroid, cholesterol problems. Performs home glucose checks N./A.  HEME/LYMPH: Denies bleeding problems.  PERIPHERAL VASCULAR:Denies claudication or cyanosis.  MUSCULOSKELETAL: Reports chronic feet, ankle, hand joint stiffness, pain or swelling. Takes allopurinol with help. Denies edema.  NEUROLOGIC: Denies history of seizures, tremors, paralysis, alteration of gait or coordination.  PSYCHIATRIC: Denies mood swings, depression, anxiety, homicidal or suicidal thoughts. Denies sleep problems.    PE:   VS: /82 (BP Location: Right arm, Patient Position: Sitting, BP Method: Large (Manual))   Pulse 74   Temp 97.3 °F (36.3 °C) (Oral)   Resp 17   Ht 5' 7" (1.702 m)   Wt 110.3 kg (243 lb 2.7 oz)   SpO2 98%   BMI 38.09 kg/m²   APPEARANCE:  Well nourished, well developed female, pleasant and obese, alert and oriented in no acute distress.    HEAD: Nontender. Full range of motion.  EYES: PERRL, conjunctiva pink, lids no edema. She wears glasses.  EARS: External canals not patent, no swelling or redness. TM's not visualized due to cerumen impaction.   NOSE: Mucosa and turbinates pink, not swollen. No discharge. Nontender sinuses.  THROAT: No pharyngeal erythema or exudate. No stridor.   NECK: Supple, no mass, thyroid not enlarged.  NODES: No cervical, axillary or inguinal lymph node enlargement.  CHEST: Normal respiratory effort. Lungs clear to auscultation.  CARDIOVASCULAR: Normal S1, S2. No rubs, murmurs or gallops. PMI not displaced. No carotid bruit. Pedal pulses palpable bilaterally. Trace pretibial edema bilaterally.  ABDOMEN: Bowel sounds present. Not distended. Soft. No tenderness, masses or " organomegaly.  BREAST EXAM: Symmetrical, no external lesions, no discharge, no masses palpated.  PELVIC EXAM: No external lesions noted, no discharge, cervix appears normal, bimanual exam showed no uterine abnormalities and no adnexal masses. Urethra and bladder intact.  RECTAL EXAM: No external hemorrhoids or anal fissures. Heme-negative stool in the rectal vault.  MUSCULOSKELETAL: No joint deformities or stiffness. She is ambulatory without problems.  SKIN: No rashes or suspicious lesions, normal color and turgor.  NEUROLOGIC:   Cranial Nerves: II-XII grossly intact.   DTR's: Knees, Ankles 2+ and equal bilaterally. Gait & Posture: Normal gait and fine motion.  PSYCHIATRIC:Patient alert, oriented x 3. Mood/Affect normal without acute anxiety or depression noted. Judgment/insight good as she makes appropriate decisions during today's exam.    ASSESSMENT:    ICD-10-CM ICD-9-CM    1. Annual physical exam Z00.00 V70.0 Liquid-based pap smear, screening      TSH      Lipid panel      Comprehensive metabolic panel      CBC auto differential      Vitamin D      Urinalysis      Hepatitis C antibody   2. Hypertension, unspecified type I10 401.9    3. Vitamin D deficiency E55.9 268.9    4. Arthritis of foot M19.079 716.97    5. Obesity (BMI 30-39.9) E66.9 278.00    6. Postmenopausal Z78.0 V49.81    7. Visit for screening mammogram Z12.31 V76.12 Mammo Digital Screening Bilat      Mammo Digital Screening Bilat   8. Screening for colon cancer Z12.11 V76.51 Case request GI: COLONOSCOPY     PLAN:  1. Age-appropriate counseling-appropriate low-sodium, low-cholesterol, low carbohydrate diet and exercise daily, monthly breast self exam, annual wellness examination.   2. Patient advised to call for results.  3. Continue current medications.  4. Keep follow up with specialists.  5. Follow up in about 6 months (around 11/25/2019) for hypertension follow up.     Answers for HPI/ROS submitted by the patient on 5/18/2019   activity change:  No  unexpected weight change: No  neck pain: No  hearing loss: No  rhinorrhea: No  trouble swallowing: No  eye discharge: No  visual disturbance: No  chest tightness: No  wheezing: No  chest pain: No  palpitations: No  blood in stool: No  constipation: No  vomiting: No  diarrhea: No  polyuria: No  difficulty urinating: No  hematuria: No  menstrual problem: No  dysuria: No  joint swelling: No  arthralgias: No  headaches: No  weakness: No  confusion: No  dysphoric mood: No     Griseofulvin Counseling:  I discussed with the patient the risks of griseofulvin including but not limited to photosensitivity, cytopenia, liver damage, nausea/vomiting and severe allergy.  The patient understands that this medication is best absorbed when taken with a fatty meal (e.g., ice cream or french fries).

## 2023-10-03 ENCOUNTER — LAB VISIT (OUTPATIENT)
Dept: LAB | Facility: HOSPITAL | Age: 63
End: 2023-10-03
Payer: COMMERCIAL

## 2023-10-03 ENCOUNTER — OFFICE VISIT (OUTPATIENT)
Dept: FAMILY MEDICINE | Facility: CLINIC | Age: 63
End: 2023-10-03
Attending: FAMILY MEDICINE
Payer: COMMERCIAL

## 2023-10-03 VITALS
SYSTOLIC BLOOD PRESSURE: 116 MMHG | RESPIRATION RATE: 18 BRPM | TEMPERATURE: 97 F | OXYGEN SATURATION: 100 % | BODY MASS INDEX: 35.68 KG/M2 | DIASTOLIC BLOOD PRESSURE: 82 MMHG | HEART RATE: 68 BPM | WEIGHT: 227.31 LBS | HEIGHT: 67 IN

## 2023-10-03 DIAGNOSIS — E66.01 SEVERE OBESITY (BMI 35.0-35.9 WITH COMORBIDITY): ICD-10-CM

## 2023-10-03 DIAGNOSIS — M19.079 ARTHRITIS OF FOOT: ICD-10-CM

## 2023-10-03 DIAGNOSIS — Z12.31 OTHER SCREENING MAMMOGRAM: ICD-10-CM

## 2023-10-03 DIAGNOSIS — Z00.00 ANNUAL PHYSICAL EXAM: Primary | ICD-10-CM

## 2023-10-03 DIAGNOSIS — Z12.11 COLON CANCER SCREENING: ICD-10-CM

## 2023-10-03 DIAGNOSIS — Z00.00 ANNUAL PHYSICAL EXAM: ICD-10-CM

## 2023-10-03 DIAGNOSIS — Z78.0 POSTMENOPAUSAL: ICD-10-CM

## 2023-10-03 DIAGNOSIS — E55.9 VITAMIN D DEFICIENCY: ICD-10-CM

## 2023-10-03 DIAGNOSIS — I10 HYPERTENSION, UNSPECIFIED TYPE: ICD-10-CM

## 2023-10-03 LAB
ALBUMIN SERPL BCP-MCNC: 4.6 G/DL (ref 3.5–5.2)
ALP SERPL-CCNC: 113 U/L (ref 55–135)
ALT SERPL W/O P-5'-P-CCNC: 22 U/L (ref 10–44)
ANION GAP SERPL CALC-SCNC: 11 MMOL/L (ref 8–16)
AST SERPL-CCNC: 22 U/L (ref 10–40)
BASOPHILS # BLD AUTO: 0.04 K/UL (ref 0–0.2)
BASOPHILS NFR BLD: 0.6 % (ref 0–1.9)
BILIRUB SERPL-MCNC: 0.6 MG/DL (ref 0.1–1)
BUN SERPL-MCNC: 14 MG/DL (ref 8–23)
CALCIUM SERPL-MCNC: 11 MG/DL (ref 8.7–10.5)
CHLORIDE SERPL-SCNC: 102 MMOL/L (ref 95–110)
CHOLEST SERPL-MCNC: 141 MG/DL (ref 120–199)
CHOLEST/HDLC SERPL: 2.6 {RATIO} (ref 2–5)
CO2 SERPL-SCNC: 25 MMOL/L (ref 23–29)
CREAT SERPL-MCNC: 0.9 MG/DL (ref 0.5–1.4)
DIFFERENTIAL METHOD: ABNORMAL
EOSINOPHIL # BLD AUTO: 0.2 K/UL (ref 0–0.5)
EOSINOPHIL NFR BLD: 2.8 % (ref 0–8)
ERYTHROCYTE [DISTWIDTH] IN BLOOD BY AUTOMATED COUNT: 12.2 % (ref 11.5–14.5)
EST. GFR  (NO RACE VARIABLE): >60 ML/MIN/1.73 M^2
ESTIMATED AVG GLUCOSE: 111 MG/DL (ref 68–131)
GLUCOSE SERPL-MCNC: 73 MG/DL (ref 70–110)
HBA1C MFR BLD: 5.5 % (ref 4–5.6)
HCT VFR BLD AUTO: 40.9 % (ref 37–48.5)
HDLC SERPL-MCNC: 55 MG/DL (ref 40–75)
HDLC SERPL: 39 % (ref 20–50)
HGB BLD-MCNC: 13.4 G/DL (ref 12–16)
IMM GRANULOCYTES # BLD AUTO: 0.01 K/UL (ref 0–0.04)
IMM GRANULOCYTES NFR BLD AUTO: 0.2 % (ref 0–0.5)
LDLC SERPL CALC-MCNC: 77.6 MG/DL (ref 63–159)
LYMPHOCYTES # BLD AUTO: 3.7 K/UL (ref 1–4.8)
LYMPHOCYTES NFR BLD: 58.9 % (ref 18–48)
MCH RBC QN AUTO: 30.5 PG (ref 27–31)
MCHC RBC AUTO-ENTMCNC: 32.8 G/DL (ref 32–36)
MCV RBC AUTO: 93 FL (ref 82–98)
MONOCYTES # BLD AUTO: 0.4 K/UL (ref 0.3–1)
MONOCYTES NFR BLD: 6.6 % (ref 4–15)
NEUTROPHILS # BLD AUTO: 2 K/UL (ref 1.8–7.7)
NEUTROPHILS NFR BLD: 30.9 % (ref 38–73)
NONHDLC SERPL-MCNC: 86 MG/DL
NRBC BLD-RTO: 0 /100 WBC
PLATELET # BLD AUTO: 278 K/UL (ref 150–450)
PMV BLD AUTO: 11.1 FL (ref 9.2–12.9)
POTASSIUM SERPL-SCNC: 3.7 MMOL/L (ref 3.5–5.1)
PROT SERPL-MCNC: 8.4 G/DL (ref 6–8.4)
RBC # BLD AUTO: 4.4 M/UL (ref 4–5.4)
SODIUM SERPL-SCNC: 138 MMOL/L (ref 136–145)
TRIGL SERPL-MCNC: 42 MG/DL (ref 30–150)
TSH SERPL DL<=0.005 MIU/L-ACNC: 1.85 UIU/ML (ref 0.4–4)
URATE SERPL-MCNC: 7.3 MG/DL (ref 2.4–5.7)
WBC # BLD AUTO: 6.32 K/UL (ref 3.9–12.7)

## 2023-10-03 PROCEDURE — 85025 COMPLETE CBC W/AUTO DIFF WBC: CPT | Performed by: FAMILY MEDICINE

## 2023-10-03 PROCEDURE — 3008F PR BODY MASS INDEX (BMI) DOCUMENTED: ICD-10-PCS | Mod: CPTII,S$GLB,, | Performed by: FAMILY MEDICINE

## 2023-10-03 PROCEDURE — 3044F PR MOST RECENT HEMOGLOBIN A1C LEVEL <7.0%: ICD-10-PCS | Mod: CPTII,S$GLB,, | Performed by: FAMILY MEDICINE

## 2023-10-03 PROCEDURE — 80061 LIPID PANEL: CPT | Performed by: FAMILY MEDICINE

## 2023-10-03 PROCEDURE — 80053 COMPREHEN METABOLIC PANEL: CPT | Performed by: FAMILY MEDICINE

## 2023-10-03 PROCEDURE — 3079F PR MOST RECENT DIASTOLIC BLOOD PRESSURE 80-89 MM HG: ICD-10-PCS | Mod: CPTII,S$GLB,, | Performed by: FAMILY MEDICINE

## 2023-10-03 PROCEDURE — 3074F PR MOST RECENT SYSTOLIC BLOOD PRESSURE < 130 MM HG: ICD-10-PCS | Mod: CPTII,S$GLB,, | Performed by: FAMILY MEDICINE

## 2023-10-03 PROCEDURE — 99999 PR PBB SHADOW E&M-EST. PATIENT-LVL V: CPT | Mod: PBBFAC,,, | Performed by: FAMILY MEDICINE

## 2023-10-03 PROCEDURE — 83036 HEMOGLOBIN GLYCOSYLATED A1C: CPT | Performed by: FAMILY MEDICINE

## 2023-10-03 PROCEDURE — 99386 PR PREVENTIVE VISIT,NEW,40-64: ICD-10-PCS | Mod: S$GLB,,, | Performed by: FAMILY MEDICINE

## 2023-10-03 PROCEDURE — 3008F BODY MASS INDEX DOCD: CPT | Mod: CPTII,S$GLB,, | Performed by: FAMILY MEDICINE

## 2023-10-03 PROCEDURE — 87624 HPV HI-RISK TYP POOLED RSLT: CPT | Performed by: FAMILY MEDICINE

## 2023-10-03 PROCEDURE — 88175 CYTOPATH C/V AUTO FLUID REDO: CPT | Performed by: FAMILY MEDICINE

## 2023-10-03 PROCEDURE — 81003 URINALYSIS AUTO W/O SCOPE: CPT | Performed by: FAMILY MEDICINE

## 2023-10-03 PROCEDURE — 84443 ASSAY THYROID STIM HORMONE: CPT | Performed by: FAMILY MEDICINE

## 2023-10-03 PROCEDURE — 86038 ANTINUCLEAR ANTIBODIES: CPT | Performed by: FAMILY MEDICINE

## 2023-10-03 PROCEDURE — 87389 HIV-1 AG W/HIV-1&-2 AB AG IA: CPT | Performed by: FAMILY MEDICINE

## 2023-10-03 PROCEDURE — 84550 ASSAY OF BLOOD/URIC ACID: CPT | Performed by: FAMILY MEDICINE

## 2023-10-03 PROCEDURE — 3079F DIAST BP 80-89 MM HG: CPT | Mod: CPTII,S$GLB,, | Performed by: FAMILY MEDICINE

## 2023-10-03 PROCEDURE — 3074F SYST BP LT 130 MM HG: CPT | Mod: CPTII,S$GLB,, | Performed by: FAMILY MEDICINE

## 2023-10-03 PROCEDURE — 82306 VITAMIN D 25 HYDROXY: CPT | Performed by: FAMILY MEDICINE

## 2023-10-03 PROCEDURE — 3044F HG A1C LEVEL LT 7.0%: CPT | Mod: CPTII,S$GLB,, | Performed by: FAMILY MEDICINE

## 2023-10-03 PROCEDURE — 99999 PR PBB SHADOW E&M-EST. PATIENT-LVL V: ICD-10-PCS | Mod: PBBFAC,,, | Performed by: FAMILY MEDICINE

## 2023-10-03 PROCEDURE — 99386 PREV VISIT NEW AGE 40-64: CPT | Mod: S$GLB,,, | Performed by: FAMILY MEDICINE

## 2023-10-03 RX ORDER — AMLODIPINE BESYLATE 2.5 MG/1
2.5 TABLET ORAL DAILY
COMMUNITY
End: 2024-03-15

## 2023-10-03 NOTE — PROGRESS NOTES
HISTORY OF PRESENT ILLNESS: Ms. Khan comes in today to as a new patient to re-establish care with me for annual wellness examination. She is not fasting but with taking medication. She states takes OTC herbal supplement pack and CBD roll-on.      END OF LIFE DECISION: She does not have a living will. She does desire life support temporarily.    Current Outpatient Medications   Medication Sig    amLODIPine (NORVASC) 2.5 MG tablet Take 2.5 mg by mouth once daily.    losartan-hydrochlorothiazide 50-12.5 mg (HYZAAR) 50-12.5 mg per tablet Take 1 tablet by mouth once daily.      SCREENINGS:    LDLCALC: May 27, 2019.  Mammogram: 2017 per patient.   Colonoscopy: Never. She desires.  Dexa Scan: Never.   GYN Exam: May 14, 2019 pap smear - okay. She desires.  Eye Exam: 2022 per patient. She wears glasses.  PPD: Positive in the past.  HCVAb: May 27, 2019.  HIVAb: Never. She desires.      Immunizations:    Tdap: > 10 years ago per patient. She declines.  Flu shot: In the past. She declines.  Shingrix: Never. Reports history of varicella not zoster. She declines.          Covid-19 (Pfizer) vaccine series - April 14, 2021, May 5, 2021, February 14, 2023.                                ROS:  GENERAL: Denies fever, chills, fatigue or unusual weight change. Appetite normal. Exercises does not due to feet pain. Monitors diet does. Weight 104.2 kg (229 lb 11.5 oz) at March 3, 2020 visit.   SKIN: Denies rashes, itching, changes in mole, color or texture of skin or easy bruising. Concerned about chin hair growth.  HEAD:  Denies headaches or recent head trauma.  EYES: Denies change in vision, pain, diplopia, redness or discharge. She wears glasses.  EARS: Denies ear pain, discharge, vertigo or decreased hearing.  NOSE: Denies loss of smell, epistaxis or rhinitis.  MOUTH & THROAT: Denies hoarseness or change in voice. Denies excessive gum bleeding or mouth sores.  Denies sore throat.  NODES: Denies swollen glands.  CHEST: Denies GUERRA,  "wheezing, cough, or sputum production.  CARDIOVASCULAR: Denies chest pain, PND, orthopnea or reduced exercise tolerance.  Denies palpitations. Does not perform home blood pressure checks. Follows with Dr. Traci Lombardo, cardiologist, with last visit with April 2023 with follow up scheduled for December 3, 2023.  ABDOMEN: Denies diarrhea, constipation, nausea, vomiting, abdominal pain, or blood in stool.  URINARY: Denies flank pain, dysuria or hematuria.  GENITOURINARY: Denies flank pain, dysuria, frequency or hematuria. Performs monthly breast exams does not.  ENDOCRINE: Denies thyroid, cholesterol problems. Performs home glucose checks  N./A . Reports intermittent occasional hot flashes.  HEME/LYMPH: Denies bleeding problems.Reports cold all the time.  PERIPHERAL VASCULAR:Denies claudication or cyanosis.  MUSCULOSKELETAL: Reports chronic feet, ankle, hand joint stiffness, pain or swelling. Reports feet pain (history of arthritis) with walking a lot and applies topical CBD roll-on with help. Does not take allopurinol. Denies edema. Reports concerned about Raynaud Syndrome as reports hands feel tingling, cold, pale with exposure to cold weather and reports takes Amlodipine 2.5 mg daily since April 2023  as prescribed by Dr. Lombardo.   NEUROLOGIC: Denies history of seizures, tremors, paralysis, alteration of gait or coordination. Reports occasional tingling into fingers.  PSYCHIATRIC: Denies mood swings, depression, anxiety, homicidal or suicidal thoughts. Denies sleep problems.    PE:   VS: Blood Pressure 116/82   Pulse 68   Temperature 97.2 °F (36.2 °C) (Tympanic)   Respiration 18   Height 5' 7" (1.702 m)   Weight 103.1 kg (227 lb 4.7 oz)   Oxygen Saturation 100%   Body Mass Index 35.60 kg/m²   APPEARANCE:  Well nourished, well developed female, pleasant and obese, alert and oriented in no acute distress.    HEAD: Nontender. Full range of motion.  EYES: PERRL, conjunctiva pink, lids no edema. She wears " glasses.  EARS: External canals not patent, no swelling or redness. TM's not visualized due to cerumen impaction.   NOSE: Mucosa and turbinates pink, not swollen. No discharge. Nontender sinuses.  THROAT: No pharyngeal erythema or exudate. No stridor.   NECK: Supple, no mass, thyroid not enlarged.  NODES: No cervical, axillary or inguinal lymph node enlargement.  CHEST: Normal respiratory effort. Lungs clear to auscultation.  CARDIOVASCULAR: Normal S1, S2. No rubs, murmurs or gallops. PMI not displaced. No carotid bruit. Pedal pulses palpable bilaterally. Trace pretibial edema bilaterally.  ABDOMEN: Bowel sounds present. Not distended. Soft. No tenderness, masses or organomegaly.  BREAST EXAM: Symmetrical, no external lesions, no discharge, no masses palpated.  PELVIC EXAM: No external lesions noted, no discharge, cervix appears normal, bimanual exam showed no uterine abnormalities and no adnexal masses. Urethra and bladder intact.  RECTAL EXAM: No external hemorrhoids or anal fissures. Heme-negative stool in the rectal vault.  MUSCULOSKELETAL: No joint deformities or stiffness. She is ambulatory without problems.  SKIN: No rashes or suspicious lesions, normal color and turgor. Dark marks at legs. Facial hair at chin. Sebaceous chst at right ear lobe - upper aspect.  NEUROLOGIC:   Cranial Nerves: II-XII grossly intact.   DTR's: Knees, Ankles 2+ and equal bilaterally. Gait & Posture: Normal gait and fine motion.  PSYCHIATRIC:Patient alert, oriented x 3. Mood/Affect normal without acute anxiety or depression noted. Judgment/insight good as she makes appropriate decisions during today's exam.    Protective Sensation (w/ 10 gram monofilament):  Right: Intact  Left: Intact    Visual Inspection:  Normal -  Bilateral    Pedal Pulses:   Right: Present  Left: Present    Posterior Tibialis Pulses:   Right:Present  Left: Present     ASSESSMENT:    ICD-10-CM ICD-9-CM    1. Annual physical exam  Z00.00 V70.0 CBC Auto  Differential      TSH      Urinalysis      Comprehensive Metabolic Panel      Lipid Panel      Hemoglobin A1C      Uric Acid      Vitamin D      HIV 1/2 Ag/Ab (4th Gen)      JOSE A      Ferritin      Liquid-Based Pap Smear, Screening      HPV High Risk Genotypes, PCR      2. Hypertension, unspecified type  I10 401.9       3. Vitamin D deficiency  E55.9 268.9       4. Arthritis of foot  M19.079 716.97       5. Severe obesity (BMI 35.0-35.9 with comorbidity)  E66.01 278.01     Z68.35 V85.35       6. Postmenopausal  Z78.0 V49.81       7. Other screening mammogram  Z12.31 V76.12 Mammo Digital Screening Bilat w/ [facility]      8. Colon cancer screening  Z12.11 V76.51 Ambulatory referral/consult to Endo Procedure           PLAN:  1. Age-appropriate counseling-appropriate low-sodium, low-cholesterol, low carbohydrate diet and exercise daily, monthly breast self exam, annual wellness examination.   2. Patient advised to call for results/follow up recommendations.  3. Continue current medications.  4. Keep follow up with specialists.  5. Follow up if symptoms worsen or fail to improve.

## 2023-10-04 ENCOUNTER — HOSPITAL ENCOUNTER (OUTPATIENT)
Dept: RADIOLOGY | Facility: HOSPITAL | Age: 63
Discharge: HOME | End: 2023-10-04
Attending: FAMILY MEDICINE
Payer: COMMERCIAL

## 2023-10-04 DIAGNOSIS — Z12.31 OTHER SCREENING MAMMOGRAM: ICD-10-CM

## 2023-10-04 LAB
25(OH)D3+25(OH)D2 SERPL-MCNC: 24 NG/ML (ref 30–96)
ANA SER QL IF: NORMAL
BILIRUB UR QL STRIP: NEGATIVE
CLARITY UR REFRACT.AUTO: CLEAR
COLOR UR AUTO: YELLOW
GLUCOSE UR QL STRIP: NEGATIVE
HGB UR QL STRIP: NEGATIVE
HIV 1+2 AB+HIV1 P24 AG SERPL QL IA: NORMAL
KETONES UR QL STRIP: NEGATIVE
LEUKOCYTE ESTERASE UR QL STRIP: NEGATIVE
NITRITE UR QL STRIP: NEGATIVE
PH UR STRIP: 6 [PH] (ref 5–8)
PROT UR QL STRIP: NEGATIVE
SP GR UR STRIP: 1.01 (ref 1–1.03)
URN SPEC COLLECT METH UR: NORMAL

## 2023-10-04 PROCEDURE — 77063 BREAST TOMOSYNTHESIS BI: CPT | Mod: 26,,, | Performed by: RADIOLOGY

## 2023-10-04 PROCEDURE — 77067 SCR MAMMO BI INCL CAD: CPT | Mod: 26,,, | Performed by: RADIOLOGY

## 2023-10-04 PROCEDURE — 77067 MAMMO DIGITAL SCREENING BILAT WITH TOMO: ICD-10-PCS | Mod: 26,,, | Performed by: RADIOLOGY

## 2023-10-04 PROCEDURE — 77063 MAMMO DIGITAL SCREENING BILAT WITH TOMO: ICD-10-PCS | Mod: 26,,, | Performed by: RADIOLOGY

## 2023-10-04 PROCEDURE — 77067 SCR MAMMO BI INCL CAD: CPT | Mod: TC

## 2023-10-05 ENCOUNTER — HOSPITAL ENCOUNTER (OUTPATIENT)
Dept: PREADMISSION TESTING | Facility: HOSPITAL | Age: 63
Discharge: HOME | End: 2023-10-05
Attending: INTERNAL MEDICINE
Payer: COMMERCIAL

## 2023-10-05 DIAGNOSIS — Z12.11 COLON CANCER SCREENING: Primary | ICD-10-CM

## 2023-10-05 RX ORDER — SODIUM, POTASSIUM,MAG SULFATES 17.5-3.13G
1 SOLUTION, RECONSTITUTED, ORAL ORAL DAILY
Qty: 1 KIT | Refills: 0 | Status: SHIPPED | OUTPATIENT
Start: 2023-10-05 | End: 2023-10-07

## 2023-10-11 ENCOUNTER — ANESTHESIA EVENT (OUTPATIENT)
Dept: ENDOSCOPY | Facility: HOSPITAL | Age: 63
End: 2023-10-11
Payer: COMMERCIAL

## 2023-10-12 ENCOUNTER — ANESTHESIA (OUTPATIENT)
Dept: ENDOSCOPY | Facility: HOSPITAL | Age: 63
End: 2023-10-12
Payer: COMMERCIAL

## 2023-10-12 ENCOUNTER — HOSPITAL ENCOUNTER (OUTPATIENT)
Facility: HOSPITAL | Age: 63
Discharge: HOME | End: 2023-10-12
Attending: STUDENT IN AN ORGANIZED HEALTH CARE EDUCATION/TRAINING PROGRAM | Admitting: STUDENT IN AN ORGANIZED HEALTH CARE EDUCATION/TRAINING PROGRAM
Payer: COMMERCIAL

## 2023-10-12 DIAGNOSIS — Z12.11 SCREENING FOR COLON CANCER: ICD-10-CM

## 2023-10-12 PROCEDURE — G0121 COLON CA SCRN NOT HI RSK IND: ICD-10-PCS | Mod: ,,, | Performed by: STUDENT IN AN ORGANIZED HEALTH CARE EDUCATION/TRAINING PROGRAM

## 2023-10-12 PROCEDURE — 37000008 HC ANESTHESIA 1ST 15 MINUTES: Performed by: STUDENT IN AN ORGANIZED HEALTH CARE EDUCATION/TRAINING PROGRAM

## 2023-10-12 PROCEDURE — 63600175 PHARM REV CODE 636 W HCPCS

## 2023-10-12 PROCEDURE — 25000003 PHARM REV CODE 250

## 2023-10-12 PROCEDURE — G0121 COLON CA SCRN NOT HI RSK IND: HCPCS | Performed by: STUDENT IN AN ORGANIZED HEALTH CARE EDUCATION/TRAINING PROGRAM

## 2023-10-12 PROCEDURE — 37000009 HC ANESTHESIA EA ADD 15 MINS: Performed by: STUDENT IN AN ORGANIZED HEALTH CARE EDUCATION/TRAINING PROGRAM

## 2023-10-12 PROCEDURE — G0121 COLON CA SCRN NOT HI RSK IND: HCPCS | Mod: ,,, | Performed by: STUDENT IN AN ORGANIZED HEALTH CARE EDUCATION/TRAINING PROGRAM

## 2023-10-12 RX ORDER — PROPOFOL 10 MG/ML
VIAL (ML) INTRAVENOUS
Status: DISCONTINUED | OUTPATIENT
Start: 2023-10-12 | End: 2023-10-12

## 2023-10-12 RX ORDER — LIDOCAINE HYDROCHLORIDE 10 MG/ML
INJECTION, SOLUTION EPIDURAL; INFILTRATION; INTRACAUDAL; PERINEURAL
Status: DISCONTINUED | OUTPATIENT
Start: 2023-10-12 | End: 2023-10-12

## 2023-10-12 RX ADMIN — PROPOFOL 40 MG: 10 INJECTION, EMULSION INTRAVENOUS at 11:10

## 2023-10-12 RX ADMIN — PROPOFOL 60 MG: 10 INJECTION, EMULSION INTRAVENOUS at 11:10

## 2023-10-12 RX ADMIN — PROPOFOL 50 MG: 10 INJECTION, EMULSION INTRAVENOUS at 11:10

## 2023-10-12 RX ADMIN — PROPOFOL 30 MG: 10 INJECTION, EMULSION INTRAVENOUS at 11:10

## 2023-10-12 RX ADMIN — LIDOCAINE HYDROCHLORIDE 20 MG: 10 SOLUTION INTRAVENOUS at 11:10

## 2023-10-12 RX ADMIN — PROPOFOL 20 MG: 10 INJECTION, EMULSION INTRAVENOUS at 11:10

## 2023-10-12 RX ADMIN — SODIUM CHLORIDE, POTASSIUM CHLORIDE, SODIUM LACTATE AND CALCIUM CHLORIDE: 600; 310; 30; 20 INJECTION, SOLUTION INTRAVENOUS at 11:10

## 2023-10-12 NOTE — PLAN OF CARE
Dr Nuñez at bs to discuss findings. VSS. No  Pain, no GI bleeding. Pt to be discharged from unit.

## 2023-10-12 NOTE — H&P
[facility] ([facility])  Colon and Rectal Surgery  History & Physical    Patient Name: Randi Khan  MRN: [id number]  Admission Date: 10/12/2023  Attending Physician: Robbie Martinez MD  Primary Care Provider: Disha Bright MD    Patient information was obtained from patient and medical records.    Subjective:     Chief Complaint/Reason for Admission: Here for Colonoscopy    History of Present Illness:  Patient is a 63 y.o. female presents for colonoscopy. Has never had one prior. Denies bleeding, melena, changes in stool habits. No FH of colon polyps CRC or IBD    No current facility-administered medications on file prior to encounter.     Current Outpatient Medications on File Prior to Encounter   Medication Sig    amLODIPine (NORVASC) 2.5 MG tablet Take 2.5 mg by mouth once daily.    losartan-hydrochlorothiazide 50-12.5 mg (HYZAAR) 50-12.5 mg per tablet Take 1 tablet by mouth once daily.        Review of patient's allergies indicates:  No Known Allergies    Past Medical History:   Diagnosis Date    Arthritis of right foot     Elevated uric acid in blood     History of gastroesophageal reflux (GERD)     History of positive PPD     Not treated; Family history of TB    HTN (hypertension) 1998    Diagnosed following pregnancy; Follows with cardiology    Postmenopausal     Vitamin D deficiency      Past Surgical History:   Procedure Laterality Date    None       Family History       Problem Relation (Age of Onset)    Allergies Daughter    Cancer Cousin    Heart failure Sister    Hypertension Mother, Father    Other Sister    Thyroid disease Daughter    Tuberculosis Paternal Uncle          Tobacco Use    Smoking status: Never    Smokeless tobacco: Never   Substance and Sexual Activity    Alcohol use: Yes     Alcohol/week: 1.0 standard drink of alcohol     Types: 1 Glasses of wine per week     Comment: rare    Drug use: No    Sexual activity: Not Currently     Partners: Male     Birth control/protection:  Post-menopausal       Objective:     Vital Signs (Most Recent):  Temp: 97.9 °F (36.6 °C) (10/12/23 1058)  Pulse: 68 (10/12/23 1058)  Resp: 18 (10/12/23 1058)  BP: (!) 143/75 (10/12/23 1058)  SpO2: 98 % (10/12/23 1058) Vital Signs (24h Range):  Temp:  [97.9 °F (36.6 °C)] 97.9 °F (36.6 °C)  Pulse:  [68] 68  Resp:  [18] 18  SpO2:  [98 %] 98 %  BP: (143)/(75) 143/75     Weight: 100.2 kg (221 lb)  Body mass index is 34.61 kg/m².    Physical Exam  Constitutional:       Appearance: She is well-developed.   HENT:      Head: Normocephalic and atraumatic.   Eyes:      Conjunctiva/sclera: Conjunctivae normal.      Pupils: Pupils are equal, round, and reactive to light.   Neck:      Thyroid: No thyromegaly.   Cardiovascular:      Rate and Rhythm: Normal rate and regular rhythm.   Pulmonary:      Effort: Pulmonary effort is normal. No respiratory distress.   Abdominal:      General: There is no distension.      Palpations: Abdomen is soft. There is no mass.      Tenderness: There is no abdominal tenderness.   Musculoskeletal:         General: No tenderness. Normal range of motion.      Cervical back: Normal range of motion.   Skin:     General: Skin is warm and dry.      Capillary Refill: Capillary refill takes less than 2 seconds.   Neurological:      General: No focal deficit present.      Mental Status: She is alert and oriented to person, place, and time.           Assessment/Plan:     Patient is a 63 y.o. female who presents for colonoscopy     - Ok to proceed to endoscopy suite for colonoscopy  - Consent obtained. All risks, benefits and alternatives fully explained to patient, including but not limited to bleeding, infection, perforation, and missed polyps. All questions appropriately answered to patient's satisfaction. Consent signed and placed on chart.    There are no hospital problems to display for this patient.    VTE Risk Mitigation (From admission, onward)      None            Cecy Nuñez MD  Colon and  Rectal Surgery  [facility] ([facility])

## 2023-10-12 NOTE — BRIEF OP NOTE
[facility] (H[facility])  Brief Operative Note     SUMMARY     Surgery Date: 10/12/2023     Surgeon(s) and Role:     * Cecy Nuñez MD - Primary    Assisting Surgeon: None    Pre-op Diagnosis:  Colon cancer screening [Z12.11]    Post-op Diagnosis:  Post-Op Diagnosis Codes:     * Colon cancer screening [Z12.11]    Procedure(s) (LRB):  COLONOSCOPY (N/A)    Anesthesia: Choice    Description of the findings of the procedure: See Op Note    Findings/Key Components: G2 internal hemorrhoids. Otherwise normal colon    Estimated Blood Loss: * No values recorded between 10/12/2023 11:27 AM and 10/12/2023 11:56 AM *         Specimens:   Specimen (24h ago, onward)      None            Discharge Note    SUMMARY     Admit Date: 10/12/2023    Discharge Date and Time: 10/12/2023 11:58 AM    Hospital Course Patient was seen in the preoperative area by both myself and anesthesia. All consents were verified and all questions appropriately answered. All risks, benefits and alternatives explained to patient. Patient proceeded to endoscopy suite for colonoscopy and was discharged home postoperative once cleared by anesthesia.    Final Diagnosis: Post-Op Diagnosis Codes:     * Colon cancer screening [Z12.11]    Disposition: Home or Self Care    Follow Up/Patient Instructions: See Provation report    Medications:  Reconciled Home Medications:      Medication List        CONTINUE taking these medications      amLODIPine 2.5 MG tablet  Commonly known as: NORVASC  Take 2.5 mg by mouth once daily.     losartan-hydrochlorothiazide 50-12.5 mg 50-12.5 mg per tablet  Commonly known as: HYZAAR  Take 1 tablet by mouth once daily.            Discharge Procedure Orders   Diet general      Follow-up Information       Disha Bright MD Follow up.    Specialty: Family Medicine  Why: As needed  Contact information:  [id number] [place] [id number] [phone]

## 2023-10-12 NOTE — TRANSFER OF CARE
"Anesthesia Transfer of Care Note    Patient: Randi Khan    Procedure(s) Performed: Procedure(s) (LRB):  COLONOSCOPY (N/A)    Patient location: PACU    Anesthesia Type: general    Transport from OR: Transported from OR on room air with adequate spontaneous ventilation    Post pain: adequate analgesia    Post assessment: tolerated procedure well and no apparent anesthetic complications    Post vital signs: stable    Level of consciousness: awake, alert and oriented    Nausea/Vomiting: no nausea/vomiting    Complications: none    Transfer of care protocol was followed      Last vitals:   Visit Vitals  BP (!) 100/55 (BP Location: Left arm, Patient Position: Lying)   Pulse (!) 56   Temp 36.6 °C (97.9 °F) (Temporal)   Resp 16   Ht 5' 7" (1.702 m)   Wt 100.2 kg (221 lb)   SpO2 96%   Breastfeeding No   BMI 34.61 kg/m²     "

## 2023-10-12 NOTE — ANESTHESIA POSTPROCEDURE EVALUATION
Anesthesia Post Evaluation    Patient: Randi Khan    Procedure(s) Performed: Procedure(s) (LRB):  COLONOSCOPY (N/A)    Final Anesthesia Type: general      Patient location during evaluation: PACU  Patient participation: Yes- Able to Participate  Level of consciousness: awake and alert  Post-procedure vital signs: reviewed and stable  Pain management: adequate  Airway patency: patent    PONV status at discharge: No PONV  Anesthetic complications: no      Cardiovascular status: blood pressure returned to baseline and hemodynamically stable  Respiratory status: unassisted, room air and spontaneous ventilation  Hydration status: euvolemic  Follow-up not needed.          Vitals Value Taken Time   BP 92/73 10/12/23 1213   Temp 36.6 °C (97.9 °F) 10/12/23 1203   Pulse 62 10/12/23 1213   Resp 18 10/12/23 1213   SpO2 96 % 10/12/23 1213         No case tracking events are documented in the log.      Pain/Adam Score: Adam Score: 9 (10/12/2023 12:13 PM)

## 2023-10-12 NOTE — ANESTHESIA PREPROCEDURE EVALUATION
10/12/2023  Randi Khan is a 63 y.o., female.      Pre-op Assessment    I have reviewed the Patient Summary Reports.     I have reviewed the Nursing Notes. I have reviewed the NPO Status.   I have reviewed the Medications.     Review of Systems  Anesthesia Hx:  Denies Family Hx of Anesthesia complications.    Cardiovascular:   Hypertension    Pulmonary:  Pulmonary Normal    Renal/:  Renal/ Normal     Hepatic/GI:  Hepatic/GI Normal    Musculoskeletal:  Musculoskeletal Normal    Neurological:  Neurology Normal    Endocrine:  Endocrine Normal    Psych:   Psychiatric History             Anesthesia Plan  Type of Anesthesia, risks & benefits discussed:    Anesthesia Type: Gen Natural Airway, MAC  Intra-op Monitoring Plan: Standard ASA Monitors  Induction:  IV  Informed Consent: Informed consent signed with the Patient and all parties understand the risks and agree with anesthesia plan.  All questions answered.   ASA Score: 2  Day of Surgery Review of History & Physical: H&P Update referred to the surgeon/provider.I have interviewed and examined the patient. I have reviewed the patient's H&P dated:     Ready For Surgery From Anesthesia Perspective.     .

## 2023-10-13 VITALS
WEIGHT: 221 LBS | TEMPERATURE: 98 F | RESPIRATION RATE: 18 BRPM | DIASTOLIC BLOOD PRESSURE: 73 MMHG | HEART RATE: 62 BPM | HEIGHT: 67 IN | SYSTOLIC BLOOD PRESSURE: 92 MMHG | OXYGEN SATURATION: 96 % | BODY MASS INDEX: 34.69 KG/M2

## 2023-11-20 ENCOUNTER — TELEPHONE (OUTPATIENT)
Dept: FAMILY MEDICINE | Facility: CLINIC | Age: 63
End: 2023-11-20
Payer: COMMERCIAL

## 2023-11-20 DIAGNOSIS — E83.52 SERUM CALCIUM ELEVATED: Primary | ICD-10-CM

## 2023-11-20 DIAGNOSIS — E79.0 HYPERURICEMIA: ICD-10-CM

## 2023-11-20 RX ORDER — ALLOPURINOL 100 MG/1
100 TABLET ORAL DAILY
Qty: 90 TABLET | Refills: 1 | Status: SHIPPED | OUTPATIENT
Start: 2023-11-20 | End: 2024-03-19

## 2023-11-20 NOTE — TELEPHONE ENCOUNTER
Patient notified of result and expressed understanding. Patient will call to schedule lab once she gets home

## 2023-11-20 NOTE — TELEPHONE ENCOUNTER
Advise pt -   Lab results are within acceptable range except the followin - high uric acid level; I recommend use restart taking allopurinol 100 mg daily.  2 - low vitamin-D; I recommend you take over-the-counter vitamin-D 2000 units daily  3 - high calcium level; I recommend she has additional lab testing for evaluation of high calcium level and call me for results.  But, needs BP appt w/me for 2024.  Please continue current medications. Thanks.    I have put the following orders and/or medications to this note.  Please advise pt and assist.    Orders Placed This Encounter   Procedures    PTH, Intact     Standing Status:   Future     Standing Expiration Date:   2025    Calcium, Ionized     Standing Status:   Future     Standing Expiration Date:   2025    Phosphorus     Standing Status:   Future     Standing Expiration Date:   2025    Calcium     Standing Status:   Future     Standing Expiration Date:   2025       Medications Ordered This Encounter   Medications    allopurinoL (ZYLOPRIM) 100 MG tablet     Sig: Take 1 tablet (100 mg total) by mouth once daily.     Dispense:  90 tablet     Refill:  1

## 2023-12-29 ENCOUNTER — LAB VISIT (OUTPATIENT)
Dept: LAB | Facility: HOSPITAL | Age: 63
End: 2023-12-29
Attending: FAMILY MEDICINE
Payer: COMMERCIAL

## 2023-12-29 DIAGNOSIS — E83.52 SERUM CALCIUM ELEVATED: ICD-10-CM

## 2023-12-29 LAB
CA-I BLDV-SCNC: 1.45 MMOL/L (ref 1.06–1.42)
CALCIUM SERPL-MCNC: 11.1 MG/DL (ref 8.7–10.5)
PHOSPHATE SERPL-MCNC: 2.9 MG/DL (ref 2.7–4.5)
PTH-INTACT SERPL-MCNC: 174.4 PG/ML (ref 9–77)

## 2023-12-29 PROCEDURE — 83970 ASSAY OF PARATHORMONE: CPT | Performed by: FAMILY MEDICINE

## 2023-12-29 PROCEDURE — 84100 ASSAY OF PHOSPHORUS: CPT | Performed by: FAMILY MEDICINE

## 2023-12-29 PROCEDURE — 82310 ASSAY OF CALCIUM: CPT | Performed by: FAMILY MEDICINE

## 2023-12-29 PROCEDURE — 36415 COLL VENOUS BLD VENIPUNCTURE: CPT | Mod: PO | Performed by: FAMILY MEDICINE

## 2023-12-29 PROCEDURE — 82330 ASSAY OF CALCIUM: CPT | Performed by: FAMILY MEDICINE

## 2024-01-24 ENCOUNTER — TELEPHONE (OUTPATIENT)
Dept: PHARMACY | Facility: CLINIC | Age: 64
End: 2024-01-24
Payer: COMMERCIAL

## 2024-02-05 ENCOUNTER — TELEPHONE (OUTPATIENT)
Dept: FAMILY MEDICINE | Facility: CLINIC | Age: 64
End: 2024-02-05
Payer: COMMERCIAL

## 2024-02-05 ENCOUNTER — PATIENT MESSAGE (OUTPATIENT)
Dept: FAMILY MEDICINE | Facility: CLINIC | Age: 64
End: 2024-02-05
Payer: COMMERCIAL

## 2024-02-05 DIAGNOSIS — E21.3 HYPERPARATHYROIDISM: Primary | ICD-10-CM

## 2024-02-05 NOTE — TELEPHONE ENCOUNTER
Patient sent over a my chart message stating the following.         I came in for additional bloodwork in December but have not received any feedback on the results. Please provide feedback at your earliest convenience.  Thank you in advance.

## 2024-02-05 NOTE — TELEPHONE ENCOUNTER
Call pt - sorry for delay - advise lab results show high parathyroid, calcium level and needs parathyroid scan to further evaluation.  Please assist pt w/scan scheduling BUT also schedule patient for in-person OR virtual visit 1 week after scan done to discuss results.     I have put the following orders and/or medications to this note.  Please advise pt and assist.    Orders Placed This Encounter   Procedures    NM Parathyroid Scan     Standing Status:   Future     Standing Expiration Date:   2/5/2025     Order Specific Question:   May the Radiologist modify the order per protocol to meet the clinical needs of the patient?     Answer:   Yes     Order Specific Question:   Release to patient     Answer:   Immediate          Thanks.

## 2024-02-06 NOTE — TELEPHONE ENCOUNTER
Notified pt of Provider message. Pt voiced understand. Informed pt of Radiology / Nuclear Med. will contact her to schedule.

## 2024-02-15 ENCOUNTER — TELEPHONE (OUTPATIENT)
Dept: FAMILY MEDICINE | Facility: CLINIC | Age: 64
End: 2024-02-15
Payer: COMMERCIAL

## 2024-02-19 ENCOUNTER — HOSPITAL ENCOUNTER (OUTPATIENT)
Dept: RADIOLOGY | Facility: HOSPITAL | Age: 64
Discharge: HOME | End: 2024-02-19
Attending: FAMILY MEDICINE
Payer: COMMERCIAL

## 2024-02-19 DIAGNOSIS — E21.3 HYPERPARATHYROIDISM: ICD-10-CM

## 2024-02-19 PROCEDURE — A9500 TC99M SESTAMIBI: HCPCS

## 2024-02-19 PROCEDURE — 78071 PARATHYRD PLANAR W/WO SUBTRJ: CPT | Mod: 26,,, | Performed by: RADIOLOGY

## 2024-03-12 ENCOUNTER — TELEPHONE (OUTPATIENT)
Dept: FAMILY MEDICINE | Facility: CLINIC | Age: 64
End: 2024-03-12
Payer: COMMERCIAL

## 2024-03-12 ENCOUNTER — PATIENT MESSAGE (OUTPATIENT)
Dept: FAMILY MEDICINE | Facility: CLINIC | Age: 64
End: 2024-03-12
Payer: COMMERCIAL

## 2024-03-12 NOTE — TELEPHONE ENCOUNTER
Advise patient parathyroid scan is okay.  I recommend she see/consult with endocrinology for further evaluation of hyperparathyroidism. Is she okay with me sending electronic consult to endocrinology? Or Does she prefer to see endocrinology in person?

## 2024-03-13 ENCOUNTER — TELEPHONE (OUTPATIENT)
Dept: FAMILY MEDICINE | Facility: CLINIC | Age: 64
End: 2024-03-13
Payer: COMMERCIAL

## 2024-03-13 DIAGNOSIS — E83.52 SERUM CALCIUM ELEVATED: ICD-10-CM

## 2024-03-13 DIAGNOSIS — I10 HYPERTENSION, UNSPECIFIED TYPE: ICD-10-CM

## 2024-03-13 DIAGNOSIS — E21.3 HYPERPARATHYROIDISM: Primary | ICD-10-CM

## 2024-03-14 ENCOUNTER — E-CONSULT (OUTPATIENT)
Dept: ENDOCRINOLOGY | Facility: CLINIC | Age: 64
End: 2024-03-14
Payer: COMMERCIAL

## 2024-03-14 DIAGNOSIS — E83.52 HYPERCALCEMIA: Primary | ICD-10-CM

## 2024-03-14 PROCEDURE — 99451 NTRPROF PH1/NTRNET/EHR 5/>: CPT | Mod: S$GLB,,, | Performed by: INTERNAL MEDICINE

## 2024-03-14 NOTE — CONSULTS
[facility] 6th [facility]  Response for E-Consult     Patient Name: Randi Khan  MRN: [id number]  Primary Care Provider: Disha Bright MD   Requesting Provider: Disha Bright MD  E-Consult to Endocrinology  Consult performed by: Alysa Campoverde MD  Consult ordered by: Disha Bright MD           64-year-old with episodic hypercalcemia as far back as 2018.  It is PTH mediated.  Her vitamin-D was low.  I do not see a 24 hour urine.  Parathyroid scan did not localize. Okay to start vitamin-D 2000 IU a day    Since they are on hydrochlorothiazide, stop for 3 months and reassess.   If persistent hypercalcemia off of hydrochlorothiazide, check a 24 urine calcium to differentiate primary hyperparathyroidism versus FHH (see below) .   Would also recommend 3 site bone density.        Also, primary hyperparathyroidism is a biochemical diagnosis so results of imaging do not impact diagnosis       Data reviewed:   Last GFR--> normal  Last vitamin-D-->    low      Last phosphorus -->    normal       24 hour urine calcium --> not done      Last BMD--> normal       not done    1/3 radius checked?     no      Fractures?     no  Kidney stones?     no    Medications:              Lithium use--> no                         hydrochlorothiazide use--> yes           :::::::::::::::::::::::::::::::::::::::::::::GENERAL INFORMATION ::::::::::::::::::::::::::::::::::::::::::::::::::::::::::::::::::::::::::::::::::::::::::::::::::::::::::::::::::::::::::::::::::::::::::::    No parathyroid imaging is necessary at this time.     Differential diagnosis includes primary hyperparathyroidism versus FHH (familial hypercalciuric hypercalcemia).       1. Check a bone density for osteoporosis including 1/3 distal radius (axial and appendicular)   2. Check a 24 hour urine for calcium       If 24 hour urine calcium is low, this may be consistent with FHH. Would check/replete vitamin-D, screen for celiac, check bone density for  osteoporosis including 1/3 distal radius (axial and appendicular)  and refer to endocrinology      If 24 urine calcium is normal or elevated this is consistent with primary hyperparathyroidism.    Would check/replete vitamin-D, check bone density for osteoporosis including 1/3 distal radius (axial and appendicular) and refer to Endocrine for a decision regarding observation, medical management, or surgical management.      ===================================================================================================================================================      Surgical indications for primary hyperparathyroidism:     Serum calcium concentration greater than 1 mg/dL above upper limit of normal     Osteoporosis (this includes checking 1/3 distal radius)     GFR less than 60   24 hour urine calcium greater than 250 mg per day in women and greater than 300 mg per day in men  Kidney stones, nephrocalcinosis  Age less than 50  Total time of Consultation: 10 minute    I did not speak to the requesting provider verbally about this.     *This eConsult is based on the clinical data available to me and is furnished without benefit of a physical examination. The eConsult will need to be interpreted in light of any clinical issues or changes in patient status not available to me at the time of filing this eConsults. Significant changes in patient condition or level of acuity should result in immediate formal consultation and reevaluation. Please alert me if you have further questions.    Thank you for this eConsult referral.     MD Uriel Delgado osmel - [facility]

## 2024-03-14 NOTE — TELEPHONE ENCOUNTER
I have put the following orders and/or medications to this note.  Please advise pt E-consult to Endocrinology has been entered and will get back with her when response is given. Thanks.    Orders Placed This Encounter   Procedures    E-Consult to Endocrinology     Order Specific Question:   Consent has been obtained from patient, and patient is aware of applicable cost? Pending specialist evaluation, I will follow up with the patient.     Answer:   Yes     Order Specific Question:   Medical Condition:     Answer:   Hypercalcemia/Parathyroid     Order Specific Question:   What is your clinical question?     Answer:   NM parathyroid scan is okay. What's the next step for evaluation, treatment, surveillance?     Order Specific Question:   Total time spent preparing for the referral and/or communicating with the consulting physician:     Answer:   <16 minutes: no billing code should be submitted

## 2024-03-15 RX ORDER — LOSARTAN POTASSIUM 50 MG/1
50 TABLET ORAL DAILY
Qty: 90 TABLET | Refills: 1 | Status: SHIPPED | OUTPATIENT
Start: 2024-03-15

## 2024-03-15 RX ORDER — AMLODIPINE BESYLATE 5 MG/1
5 TABLET ORAL DAILY
Qty: 90 TABLET | Refills: 1 | Status: SHIPPED | OUTPATIENT
Start: 2024-03-15

## 2024-03-15 NOTE — TELEPHONE ENCOUNTER
Advise pt ENDOCRINOLOGIST recommended the following:  Take OTC vitamin D 2000 units daily for low vitamin D.  Stop HCTZ part of Losartan-HCTZ for 3 months as may cause high calcium, then recheck calcium level.  If calcium still high, needs 24-hour urine calcium test.    So,     let's change Losartan-HCTZ to Losartan 50 mg daily and increase Amlodipine 2.5 mg to 5 mg daily to offset any BP medication changes to make sure BP is controlled.  New Rxs sent to pharmacy.    let's add OTC vitamin D 2000 units daily.    Needs 3-month f/u appt w/me for high calcium recheck.     I have put the following orders and/or medications to this note.  Please advise pt and assist.    Orders Placed This Encounter   Procedures    E-Consult to Endocrinology     Order Specific Question:   Consent has been obtained from patient, and patient is aware of applicable cost? Pending specialist evaluation, I will follow up with the patient.     Answer:   Yes     Order Specific Question:   Medical Condition:     Answer:   Hypercalcemia/Parathyroid     Order Specific Question:   What is your clinical question?     Answer:   NM parathyroid scan is okay. What's the next step for evaluation, treatment, surveillance?     Order Specific Question:   Total time spent preparing for the referral and/or communicating with the consulting physician:     Answer:   <16 minutes: no billing code should be submitted       Medications Ordered This Encounter   Medications    amLODIPine (NORVASC) 5 MG tablet     Sig: Take 1 tablet (5 mg total) by mouth once daily.     Dispense:  90 tablet     Refill:  1     .    losartan (COZAAR) 50 MG tablet     Sig: Take 1 tablet (50 mg total) by mouth once daily.     Dispense:  90 tablet     Refill:  1     .       Thanks.

## 2024-03-19 DIAGNOSIS — E79.0 HYPERURICEMIA: ICD-10-CM

## 2024-03-19 RX ORDER — ALLOPURINOL 100 MG/1
100 TABLET ORAL
Qty: 90 TABLET | Refills: 1 | Status: SHIPPED | OUTPATIENT
Start: 2024-03-19

## 2024-03-19 NOTE — TELEPHONE ENCOUNTER
No care due was identified.  [facility] Embedded Care Due Messages. Reference number: [id number].   3/19/2024 11:11:22 AM CDT

## 2024-03-20 NOTE — TELEPHONE ENCOUNTER
Refill Decision Note   Randi Khan  is requesting a refill authorization.  Brief Assessment and Rationale for Refill:  Approve     Medication Therapy Plan:         Comments:     Note composed:11:48 PM 03/19/2024

## 2024-07-05 ENCOUNTER — LAB VISIT (OUTPATIENT)
Dept: LAB | Facility: HOSPITAL | Age: 64
End: 2024-07-05
Attending: FAMILY MEDICINE
Payer: COMMERCIAL

## 2024-07-05 ENCOUNTER — OFFICE VISIT (OUTPATIENT)
Dept: FAMILY MEDICINE | Facility: CLINIC | Age: 64
End: 2024-07-05
Attending: FAMILY MEDICINE
Payer: COMMERCIAL

## 2024-07-05 VITALS
RESPIRATION RATE: 18 BRPM | WEIGHT: 235.44 LBS | OXYGEN SATURATION: 99 % | HEIGHT: 67 IN | BODY MASS INDEX: 36.95 KG/M2 | TEMPERATURE: 98 F | DIASTOLIC BLOOD PRESSURE: 78 MMHG | HEART RATE: 64 BPM | SYSTOLIC BLOOD PRESSURE: 122 MMHG

## 2024-07-05 DIAGNOSIS — E83.52 SERUM CALCIUM ELEVATED: ICD-10-CM

## 2024-07-05 DIAGNOSIS — Z78.0 POSTMENOPAUSAL: ICD-10-CM

## 2024-07-05 DIAGNOSIS — E66.01 SEVERE OBESITY (BMI 35.0-39.9) WITH COMORBIDITY: ICD-10-CM

## 2024-07-05 DIAGNOSIS — E83.52 SERUM CALCIUM ELEVATED: Primary | ICD-10-CM

## 2024-07-05 DIAGNOSIS — E79.0 HYPERURICEMIA: ICD-10-CM

## 2024-07-05 DIAGNOSIS — I10 HYPERTENSION, UNSPECIFIED TYPE: ICD-10-CM

## 2024-07-05 DIAGNOSIS — E21.3 HYPERPARATHYROIDISM: ICD-10-CM

## 2024-07-05 DIAGNOSIS — R60.9 EDEMA, UNSPECIFIED TYPE: ICD-10-CM

## 2024-07-05 LAB
ANION GAP SERPL CALC-SCNC: 8 MMOL/L (ref 8–16)
BUN SERPL-MCNC: 19 MG/DL (ref 8–23)
CALCIUM SERPL-MCNC: 11 MG/DL (ref 8.7–10.5)
CHLORIDE SERPL-SCNC: 107 MMOL/L (ref 95–110)
CO2 SERPL-SCNC: 26 MMOL/L (ref 23–29)
CREAT SERPL-MCNC: 1.1 MG/DL (ref 0.5–1.4)
EST. GFR  (NO RACE VARIABLE): 56.5 ML/MIN/1.73 M^2
GLUCOSE SERPL-MCNC: 81 MG/DL (ref 70–110)
POTASSIUM SERPL-SCNC: 4.5 MMOL/L (ref 3.5–5.1)
SODIUM SERPL-SCNC: 141 MMOL/L (ref 136–145)

## 2024-07-05 PROCEDURE — 1159F MED LIST DOCD IN RCRD: CPT | Mod: CPTII,S$GLB,, | Performed by: FAMILY MEDICINE

## 2024-07-05 PROCEDURE — G2211 COMPLEX E/M VISIT ADD ON: HCPCS | Mod: S$GLB,,, | Performed by: FAMILY MEDICINE

## 2024-07-05 PROCEDURE — 3078F DIAST BP <80 MM HG: CPT | Mod: CPTII,S$GLB,, | Performed by: FAMILY MEDICINE

## 2024-07-05 PROCEDURE — 99214 OFFICE O/P EST MOD 30 MIN: CPT | Mod: S$GLB,,, | Performed by: FAMILY MEDICINE

## 2024-07-05 PROCEDURE — 3074F SYST BP LT 130 MM HG: CPT | Mod: CPTII,S$GLB,, | Performed by: FAMILY MEDICINE

## 2024-07-05 PROCEDURE — 36415 COLL VENOUS BLD VENIPUNCTURE: CPT | Mod: PO | Performed by: FAMILY MEDICINE

## 2024-07-05 PROCEDURE — 1160F RVW MEDS BY RX/DR IN RCRD: CPT | Mod: CPTII,S$GLB,, | Performed by: FAMILY MEDICINE

## 2024-07-05 PROCEDURE — 3008F BODY MASS INDEX DOCD: CPT | Mod: CPTII,S$GLB,, | Performed by: FAMILY MEDICINE

## 2024-07-05 PROCEDURE — 99999 PR PBB SHADOW E&M-EST. PATIENT-LVL IV: CPT | Mod: PBBFAC,,, | Performed by: FAMILY MEDICINE

## 2024-07-05 PROCEDURE — 80048 BASIC METABOLIC PNL TOTAL CA: CPT | Performed by: FAMILY MEDICINE

## 2024-07-05 PROCEDURE — 4010F ACE/ARB THERAPY RXD/TAKEN: CPT | Mod: CPTII,S$GLB,, | Performed by: FAMILY MEDICINE

## 2024-07-05 RX ORDER — FUROSEMIDE 20 MG/1
TABLET ORAL
Qty: 30 TABLET | Refills: 5 | Status: SHIPPED | OUTPATIENT
Start: 2024-07-05

## 2024-07-05 NOTE — PROGRESS NOTES
"Randi Khan    Chief Complaint   Patient presents with    Follow-up     3 month f/u for high calcium        History of Present Illness:       Ms. Khan comes in today for 3-month high calcium follow up.  She states she has not fasting but has taken medications today.  She states she "feels more off" and "feels more swelling in her ankles" with taking new medication regimen.  She states she rarely performs home blood pressure checks with levels ranging 121/70 when checked several days ago.    She states she monitors her diet but does not exercise.    On March 13, 2024 based on endocrinology consultation/recommendation I advised she change Losartan-HCTZ (stop HCTZ) to Losartan 50 mg daily and increase Amlodipine 2.5 mg to 5 mg daily to offset any BP medication changes to make sure BP is controlled; add OTC vitamin D 2000 units daily; and see me in 3-month f/u appt w/me for high calcium recheck. Per endocrinology recommendation, if calcium remains high, needs 24-hour urine calcium test.    Otherwise, she denies having fever, chills, fatigue, appetite changes; shortness of breath, cough, wheezing; chest pain, palpitations, leg swelling; abdominal pain, nausea, vomiting, diarrhea, constipation; unusual urinary symptoms; polydipsia, polyphagia, polyuria, hot or cold intolerance; back pain; numbness, headache; anxiety, depression, homicidal or suicidal thoughts.              Labs:                    WBC                      6.32                10/03/2023                 HGB                      13.4                10/03/2023                 HCT                      40.9                10/03/2023                 PLT                      278                 10/03/2023                 CHOL                     141                 10/03/2023                 TRIG                     42                  10/03/2023                 HDL                      55                  10/03/2023                 ALT                     "  22                  10/03/2023                 AST                      22                  10/03/2023                 NA                       138                 10/03/2023                 K                        3.7                 10/03/2023                 CL                       102                 10/03/2023                 CREATININE               0.9                 10/03/2023                 BUN                      14                  10/03/2023                 CO2                      25                  10/03/2023                 TSH                      1.853               10/03/2023                 HGBA1C                   5.5                 10/03/2023                LDLCALC                  77.6                10/03/2023                  Current Outpatient Medications   Medication Sig    allopurinoL (ZYLOPRIM) 100 MG tablet TAKE 1 TABLET(100 MG) BY MOUTH EVERY DAY    amLODIPine (NORVASC) 5 MG tablet Take 1 tablet (5 mg total) by mouth once daily.    losartan (COZAAR) 50 MG tablet Take 1 tablet (50 mg total) by mouth once daily.       Review of Systems   Constitutional:  Negative for activity change, appetite change, chills, fatigue and fever.        Wt 103.1 kg (227 lb 4.7 oz) at October 3, 2023 visit.   Respiratory:  Negative for cough, shortness of breath and wheezing.    Cardiovascular:  Negative for chest pain, palpitations and leg swelling.        See history of present illness.   Gastrointestinal:  Negative for abdominal pain, constipation, diarrhea, nausea and vomiting.   Endocrine: Negative for cold intolerance, heat intolerance, polydipsia, polyphagia and polyuria.   Genitourinary:  Negative for difficulty urinating.   Musculoskeletal:  Positive for joint swelling. Negative for back pain.        See history of present illness.   Neurological:  Negative for numbness and headaches.   Psychiatric/Behavioral:  Negative for dysphoric mood and suicidal ideas. The patient is not  nervous/anxious.         Negative for homicidal ideas. See history of present illness.       Objective:  Physical Exam  Vitals reviewed.   Constitutional:       General: She is not in acute distress.     Appearance: Normal appearance. She is well-developed. She is obese. She is not ill-appearing, toxic-appearing or diaphoretic.      Comments: Pleasant.   Eyes:      Comments: She wears glasses.   Neck:      Thyroid: No thyromegaly.   Cardiovascular:      Rate and Rhythm: Normal rate and regular rhythm.      Heart sounds: Normal heart sounds. No murmur heard.  Pulmonary:      Effort: Pulmonary effort is normal. No respiratory distress.      Breath sounds: Normal breath sounds. No stridor. No wheezing.   Abdominal:      General: Bowel sounds are normal. There is no distension.      Palpations: Abdomen is soft. There is no mass.      Tenderness: There is no abdominal tenderness. There is no guarding.   Musculoskeletal:         General: No swelling or tenderness. Normal range of motion.      Cervical back: Normal range of motion and neck supple. No tenderness.      Comments: She is ambulatory without problems.  Subtle swelling at both ankles.   Lymphadenopathy:      Cervical: No cervical adenopathy.   Neurological:      General: No focal deficit present.      Mental Status: She is alert and oriented to person, place, and time.   Psychiatric:         Mood and Affect: Mood normal.         Behavior: Behavior normal.         Thought Content: Thought content normal.         Judgment: Judgment normal.         ASSESSMENT:  1. Serum calcium elevated    2. Hypertension, unspecified type    3. Hyperuricemia    4. Hyperparathyroidism    5. Edema, unspecified type    6. Severe obesity (BMI 35.0-39.9) with comorbidity    7. Postmenopausal        PLAN:  Randi was seen today for follow-up.    Diagnoses and all orders for this visit:    Serum calcium elevated  -     Basic Metabolic Panel; Future  -     DXA Bone Density Axial Skeleton 1  or more sites; Future    Hypertension, unspecified type    Hyperuricemia    Hyperparathyroidism  -     DXA Bone Density Axial Skeleton 1 or more sites; Future    Edema, unspecified type  -     furosemide (LASIX) 20 MG tablet; Take 1/2 pill by mouth daily    Severe obesity (BMI 35.0-39.9) with comorbidity    Postmenopausal  -     DXA Bone Density Axial Skeleton 1 or more sites; Future      Patient advised to call for results/follow up recommendations.  Continue current medications, follow low sodium, low cholesterol, low carb diet, daily walks.  Add Lasix 20 mg - 1/2 pill daily for leg swelling; medication precautions discussed with patient.  Home BP monitoring advised.  Keep follow up with specialists.  Flu shot this fall.  Follow up in about 3 months (around 10/3/2024) for physical.

## 2024-07-08 ENCOUNTER — APPOINTMENT (OUTPATIENT)
Dept: RADIOLOGY | Facility: HOSPITAL | Age: 64
End: 2024-07-08
Attending: FAMILY MEDICINE
Payer: COMMERCIAL

## 2024-07-08 DIAGNOSIS — Z78.0 POSTMENOPAUSAL: ICD-10-CM

## 2024-07-08 DIAGNOSIS — E83.52 SERUM CALCIUM ELEVATED: ICD-10-CM

## 2024-07-08 DIAGNOSIS — E21.3 HYPERPARATHYROIDISM: ICD-10-CM

## 2024-07-08 PROCEDURE — 77080 DXA BONE DENSITY AXIAL: CPT | Mod: 26,,, | Performed by: RADIOLOGY

## 2024-07-08 PROCEDURE — 77080 DXA BONE DENSITY AXIAL: CPT | Mod: TC

## 2024-07-24 DIAGNOSIS — E83.52 SERUM CALCIUM ELEVATED: Primary | ICD-10-CM

## 2024-07-24 PROBLEM — E66.01 SEVERE OBESITY (BMI 35.0-39.9) WITH COMORBIDITY: Status: ACTIVE | Noted: 2024-07-24

## 2024-07-29 ENCOUNTER — LAB VISIT (OUTPATIENT)
Dept: LAB | Facility: HOSPITAL | Age: 64
End: 2024-07-29
Attending: FAMILY MEDICINE
Payer: COMMERCIAL

## 2024-07-29 DIAGNOSIS — E83.52 SERUM CALCIUM ELEVATED: ICD-10-CM

## 2024-07-29 LAB
CALCIUM 24H UR-MRATE: 19 MG/HR (ref 4–12)
CALCIUM UR-MCNC: 36.4 MG/DL (ref 0–15)
CALCIUM URINE (MG/SPEC): 455 MG/SPEC
URINE COLLECTION DURATION: 24 HR
URINE VOLUME: 1250 ML

## 2024-07-29 PROCEDURE — 82340 ASSAY OF CALCIUM IN URINE: CPT | Performed by: FAMILY MEDICINE

## 2024-10-07 ENCOUNTER — OFFICE VISIT (OUTPATIENT)
Dept: FAMILY MEDICINE | Facility: CLINIC | Age: 64
End: 2024-10-07
Attending: FAMILY MEDICINE
Payer: COMMERCIAL

## 2024-10-07 ENCOUNTER — HOSPITAL ENCOUNTER (OUTPATIENT)
Dept: RADIOLOGY | Facility: HOSPITAL | Age: 64
Discharge: HOME | End: 2024-10-07
Attending: FAMILY MEDICINE
Payer: COMMERCIAL

## 2024-10-07 VITALS
DIASTOLIC BLOOD PRESSURE: 78 MMHG | TEMPERATURE: 98 F | OXYGEN SATURATION: 99 % | SYSTOLIC BLOOD PRESSURE: 126 MMHG | HEIGHT: 67 IN | BODY MASS INDEX: 37.2 KG/M2 | HEART RATE: 55 BPM | WEIGHT: 237 LBS | RESPIRATION RATE: 18 BRPM

## 2024-10-07 VITALS — BODY MASS INDEX: 36.95 KG/M2 | WEIGHT: 235.44 LBS | HEIGHT: 67 IN

## 2024-10-07 DIAGNOSIS — E55.9 VITAMIN D DEFICIENCY: ICD-10-CM

## 2024-10-07 DIAGNOSIS — E21.3 HYPERPARATHYROIDISM: ICD-10-CM

## 2024-10-07 DIAGNOSIS — Z12.31 ENCOUNTER FOR SCREENING MAMMOGRAM FOR BREAST CANCER: ICD-10-CM

## 2024-10-07 DIAGNOSIS — Z00.00 ANNUAL PHYSICAL EXAM: Primary | ICD-10-CM

## 2024-10-07 DIAGNOSIS — E66.01 SEVERE OBESITY (BMI 35.0-39.9) WITH COMORBIDITY: ICD-10-CM

## 2024-10-07 DIAGNOSIS — I27.29 OTHER SECONDARY PULMONARY HYPERTENSION: ICD-10-CM

## 2024-10-07 DIAGNOSIS — Z78.0 POSTMENOPAUSAL: ICD-10-CM

## 2024-10-07 DIAGNOSIS — I10 HYPERTENSION, UNSPECIFIED TYPE: ICD-10-CM

## 2024-10-07 DIAGNOSIS — E79.0 HYPERURICEMIA: ICD-10-CM

## 2024-10-07 LAB
BILIRUB UR QL STRIP: NEGATIVE
CLARITY UR REFRACT.AUTO: CLEAR
COLOR UR AUTO: YELLOW
GLUCOSE UR QL STRIP: NEGATIVE
HGB UR QL STRIP: NEGATIVE
KETONES UR QL STRIP: NEGATIVE
LEUKOCYTE ESTERASE UR QL STRIP: NEGATIVE
NITRITE UR QL STRIP: NEGATIVE
PH UR STRIP: 6 [PH] (ref 5–8)
PROT UR QL STRIP: NEGATIVE
SP GR UR STRIP: 1.02 (ref 1–1.03)
URN SPEC COLLECT METH UR: NORMAL

## 2024-10-07 PROCEDURE — 99999 PR PBB SHADOW E&M-EST. PATIENT-LVL V: CPT | Mod: PBBFAC,,, | Performed by: FAMILY MEDICINE

## 2024-10-07 PROCEDURE — 3078F DIAST BP <80 MM HG: CPT | Mod: CPTII,S$GLB,, | Performed by: FAMILY MEDICINE

## 2024-10-07 PROCEDURE — 1160F RVW MEDS BY RX/DR IN RCRD: CPT | Mod: CPTII,S$GLB,, | Performed by: FAMILY MEDICINE

## 2024-10-07 PROCEDURE — 4010F ACE/ARB THERAPY RXD/TAKEN: CPT | Mod: CPTII,S$GLB,, | Performed by: FAMILY MEDICINE

## 2024-10-07 PROCEDURE — 77063 BREAST TOMOSYNTHESIS BI: CPT | Mod: 26,,, | Performed by: STUDENT IN AN ORGANIZED HEALTH CARE EDUCATION/TRAINING PROGRAM

## 2024-10-07 PROCEDURE — 3074F SYST BP LT 130 MM HG: CPT | Mod: CPTII,S$GLB,, | Performed by: FAMILY MEDICINE

## 2024-10-07 PROCEDURE — 1159F MED LIST DOCD IN RCRD: CPT | Mod: CPTII,S$GLB,, | Performed by: FAMILY MEDICINE

## 2024-10-07 PROCEDURE — 77067 SCR MAMMO BI INCL CAD: CPT | Mod: 26,,, | Performed by: STUDENT IN AN ORGANIZED HEALTH CARE EDUCATION/TRAINING PROGRAM

## 2024-10-07 PROCEDURE — 99396 PREV VISIT EST AGE 40-64: CPT | Mod: S$GLB,,, | Performed by: FAMILY MEDICINE

## 2024-10-07 PROCEDURE — 77063 BREAST TOMOSYNTHESIS BI: CPT | Mod: TC

## 2024-10-07 PROCEDURE — 81003 URINALYSIS AUTO W/O SCOPE: CPT | Performed by: FAMILY MEDICINE

## 2024-10-07 PROCEDURE — 77067 SCR MAMMO BI INCL CAD: CPT | Mod: TC

## 2024-10-07 PROCEDURE — 3008F BODY MASS INDEX DOCD: CPT | Mod: CPTII,S$GLB,, | Performed by: FAMILY MEDICINE

## 2024-10-07 NOTE — PROGRESS NOTES
HISTORY OF PRESENT ILLNESS: Ms. Khan comes in today for annual wellness examination. She is fasting but with taking medication.      END OF LIFE DECISION: She does not have a living will. She does desire life support temporarily.    Current Outpatient Medications   Medication Sig    allopurinoL (ZYLOPRIM) 100 MG tablet TAKE 1 TABLET(100 MG) BY MOUTH EVERY DAY    amLODIPine (NORVASC) 5 MG tablet Take 1 tablet (5 mg total) by mouth once daily.    furosemide (LASIX) 20 MG tablet Take 1/2 pill by mouth daily    losartan (COZAAR) 50 MG tablet Take 1 tablet (50 mg total) by mouth once daily.       SCREENINGS:    LDLCALC: October 3, 2023.  Mammogram: October 4, 2023 - okay. Performed today - pending. 2017 per patient.   Colonoscopy: October 12, 2023 - non-bleeding internal hemorrhoids; repeat in 10 years.   Dexa Scan: July 8, 2024 - okay.   GYN Exam: October 3, 2023 HPV, pap smear - okay. Pap smear due 2028.  Eye Exam: November 2023 per patient. She wears glasses.  PPD: Positive in the past.  HCVAb: May 27, 2019.  HIVAb: October 3, 2023.      Immunizations:    Tdap: > 10 years ago per patient. She declines.  Flu shot: In the past. She declines.  Shingrix: Never. Reports history of varicella not zoster. She declines.          RSV: Never. Advised patient available at local pharmacy.  Covid-19 (Pfizer) vaccine series - April 14, 2021, May 5, 2021, February 14, 2023.                                ROS:  GENERAL: Denies fever, chills, fatigue or unusual weight change. Appetite normal. Exercises does.  Monitors diet does. Weight 106.8 kg (235 lb 7.2 oz) ) at July 5, 2024  visit.   SKIN: Denies rashes, itching, changes in mole, color or texture of skin or easy bruising.   HEAD:  Denies headaches or recent head trauma.  EYES: Denies change in vision, pain, diplopia, redness or discharge. She wears glasses.  EARS: Denies ear pain, discharge, vertigo or decreased hearing.  NOSE: Denies loss of smell, epistaxis or rhinitis.  MOUTH &  THROAT: Denies hoarseness or change in voice. Denies excessive gum bleeding or mouth sores.  Denies sore throat.  NODES: Denies swollen glands.  CHEST: Denies GUERRA, wheezing, cough, or sputum production.  CARDIOVASCULAR: Denies chest pain, PND, orthopnea or reduced exercise tolerance.  Denies palpitations. Does not perform home blood pressure checks. Follows with Dr. Traci Lombardo, cardiologist, with last visit on December 11, 2023 for other secondary pulmonary hypertension, palpitations, abnormal electrocardiogram, obesity, unspecified, edema, unspecified, bradycardia, unspecified, essential (primary) hypertension, chest pain, unspecified, gastro-esophageal reflux disease without esophagitiswith annual follow up scheduled for December 2024.  ABDOMEN: Denies diarrhea, constipation, nausea, vomiting, abdominal pain, or blood in stool.  URINARY: Denies flank pain, dysuria or hematuria.  GENITOURINARY: Denies flank pain, dysuria, frequency or hematuria. Performs monthly breast exams does.  ENDOCRINE: Denies thyroid, cholesterol problems. Performs home glucose checks  N./A .   HEME/LYMPH: Denies bleeding problems. Reports chronic cold all the time.  PERIPHERAL VASCULAR:Denies claudication or cyanosis.  MUSCULOSKELETAL: Reports chronic feet, ankle, hand joint stiffness, pain or swelling. Reports chronic feet pain (history of arthritis) with walking a lot and applies topical CBD roll-on with help. Does not take allopurinol. Denies edema. Reports concerned about Raynaud Syndrome as reports hands feel tingling, cold, pale with exposure to cold weather and reports takes Amlodipine 2.5 mg daily since April 2023  as prescribed by Dr. Lombardo.   NEUROLOGIC: Denies history of seizures, tremors, paralysis, alteration of gait or coordination. Reports chronic, occasional tingling into fingers with little help with Amlodipine.  PSYCHIATRIC: Denies mood swings, depression, anxiety, homicidal or suicidal thoughts. Denies sleep  "problems.    PE:   VS: /78   Pulse (!) 55   Temp 97.7 °F (36.5 °C) (Tympanic)   Resp 18   Ht 5' 7" (1.702 m)   Wt 107.5 kg (236 lb 15.9 oz)   SpO2 99%   BMI 37.12 kg/m²   APPEARANCE:  Well nourished, well developed female, pleasant and obese, alert and oriented in no acute distress.    HEAD: Nontender. Full range of motion.  EYES: PERRL, conjunctiva pink, lids no edema. She wears glasses.  EARS: External canals, no swelling or redness. TM's shiny and clear.   NOSE: Mucosa and turbinates pink, not swollen. No discharge. Nontender sinuses.  THROAT: No pharyngeal erythema or exudate. No stridor.   NECK: Supple, no mass, thyroid not enlarged.  NODES: No cervical, axillary lymph node enlargement.  CHEST: Normal respiratory effort. Lungs clear to auscultation.  CARDIOVASCULAR: Normal S1, S2. No rubs, murmurs or gallops. PMI not displaced. No carotid bruit. Pedal pulses palpable bilaterally. Trace pretibial edema bilaterally.  ABDOMEN: Bowel sounds present. Not distended. Soft. No tenderness, masses or organomegaly.  BREAST EXAM: Symmetrical, no external lesions, no discharge, no masses palpated.  PELVIC EXAM: Not examined today.  RECTAL EXAM: Not examined today.  MUSCULOSKELETAL: No joint deformities or stiffness. She is ambulatory without problems.  SKIN: No rashes or suspicious lesions, normal color and turgor. Dark marks at legs.   NEUROLOGIC:   Cranial Nerves: II-XII grossly intact.   DTR's: Knees, Ankles 2+ and equal bilaterally. Gait & Posture: Normal gait and fine motion.  PSYCHIATRIC:Patient alert, oriented x 3. Mood/Affect normal without acute anxiety or depression noted. Judgment/insight good as she makes appropriate decisions during today's exam.    Protective Sensation (w/ 10 gram monofilament):  Right: Intact  Left: Intact    Visual Inspection:  Normal -  Bilateral    Pedal Pulses:   Right: Present  Left: Present    Posterior Tibialis Pulses:   Right:Present  Left: Present     Calcium, 24 Hr " Urine       07/20/2024 4 - 12 mg/Hr 19 High        ASSESSMENT:    ICD-10-CM ICD-9-CM    1. Annual physical exam  Z00.00 V70.0 CBC Auto Differential      TSH      Lipid Panel      Comprehensive Metabolic Panel      Hemoglobin A1C      Urinalysis      Uric Acid      PTH, Intact      Vitamin D      2. Hypertension, unspecified type  I10 401.9       3. Vitamin D deficiency  E55.9 268.9       4. Hyperparathyroidism  E21.3 252.00 Ambulatory referral/consult to Endocrinology      5. Hyperuricemia  E79.0 790.6       6. Other secondary pulmonary hypertension  I27.29 416.8       7. Severe obesity (BMI 35.0-39.9) with comorbidity  E66.01 278.01       8. Postmenopausal  Z78.0 V49.81         PLAN:  1. Age-appropriate counseling-appropriate low-sodium, low-cholesterol, low carbohydrate diet and exercise daily, monthly breast self exam, annual wellness examination.   2. Patient advised to call for results/follow up recommendations.  3. Continue current medications.  4. Keep follow up with specialists (including endocrine referral for decision regarding observation, medical management, of surgical management for primary hyperparathyroidism).  5. Follow up in about 6 months (around 4/7/2025) for hypertension follow up.

## 2024-10-14 DIAGNOSIS — I10 HYPERTENSION, UNSPECIFIED TYPE: ICD-10-CM

## 2024-10-14 RX ORDER — LOSARTAN POTASSIUM 50 MG/1
TABLET ORAL
Qty: 90 TABLET | Refills: 3 | Status: SHIPPED | OUTPATIENT
Start: 2024-10-14

## 2024-10-14 RX ORDER — AMLODIPINE BESYLATE 5 MG/1
5 TABLET ORAL
Qty: 90 TABLET | Refills: 3 | Status: SHIPPED | OUTPATIENT
Start: 2024-10-14

## 2024-10-14 NOTE — TELEPHONE ENCOUNTER
No care due was identified.  [facility] Embedded Care Due Messages. Reference number: [id number].   10/14/2024 7:48:50 AM CDT

## 2024-10-14 NOTE — TELEPHONE ENCOUNTER
Refill Decision Note   Randi Khan  is requesting a refill authorization.  Brief Assessment and Rationale for Refill:  Approve     Medication Therapy Plan:         Comments:     Note composed:12:28 PM 10/14/2024

## 2025-01-24 ENCOUNTER — HOSPITAL ENCOUNTER (OUTPATIENT)
Dept: RADIOLOGY | Facility: HOSPITAL | Age: 65
Discharge: HOME | End: 2025-01-24
Attending: INTERNAL MEDICINE
Payer: COMMERCIAL

## 2025-01-24 ENCOUNTER — TELEPHONE (OUTPATIENT)
Dept: SURGERY | Facility: CLINIC | Age: 65
End: 2025-01-24
Payer: COMMERCIAL

## 2025-01-24 ENCOUNTER — OFFICE VISIT (OUTPATIENT)
Dept: ENDOCRINOLOGY | Facility: CLINIC | Age: 65
End: 2025-01-24
Attending: FAMILY MEDICINE
Payer: COMMERCIAL

## 2025-01-24 VITALS
BODY MASS INDEX: 35.84 KG/M2 | WEIGHT: 228.81 LBS | HEART RATE: 62 BPM | SYSTOLIC BLOOD PRESSURE: 134 MMHG | TEMPERATURE: 98 F | DIASTOLIC BLOOD PRESSURE: 81 MMHG

## 2025-01-24 DIAGNOSIS — E21.0 PRIMARY HYPERPARATHYROIDISM: ICD-10-CM

## 2025-01-24 DIAGNOSIS — Z78.0 POSTMENOPAUSAL: ICD-10-CM

## 2025-01-24 DIAGNOSIS — E66.01 SEVERE OBESITY (BMI 35.0-39.9) WITH COMORBIDITY: ICD-10-CM

## 2025-01-24 DIAGNOSIS — E83.52 HYPERCALCEMIA: ICD-10-CM

## 2025-01-24 DIAGNOSIS — E21.0 PRIMARY HYPERPARATHYROIDISM: Primary | ICD-10-CM

## 2025-01-24 DIAGNOSIS — E21.3 HYPERPARATHYROIDISM: ICD-10-CM

## 2025-01-24 PROCEDURE — 76536 US EXAM OF HEAD AND NECK: CPT | Mod: 26,,, | Performed by: RADIOLOGY

## 2025-01-24 PROCEDURE — 3075F SYST BP GE 130 - 139MM HG: CPT | Mod: CPTII,S$GLB,, | Performed by: INTERNAL MEDICINE

## 2025-01-24 PROCEDURE — 1159F MED LIST DOCD IN RCRD: CPT | Mod: CPTII,S$GLB,, | Performed by: INTERNAL MEDICINE

## 2025-01-24 PROCEDURE — 99999 PR PBB SHADOW E&M-EST. PATIENT-LVL IV: CPT | Mod: PBBFAC,,, | Performed by: INTERNAL MEDICINE

## 2025-01-24 PROCEDURE — 3079F DIAST BP 80-89 MM HG: CPT | Mod: CPTII,S$GLB,, | Performed by: INTERNAL MEDICINE

## 2025-01-24 PROCEDURE — G2211 COMPLEX E/M VISIT ADD ON: HCPCS | Mod: S$GLB,,, | Performed by: INTERNAL MEDICINE

## 2025-01-24 PROCEDURE — 99204 OFFICE O/P NEW MOD 45 MIN: CPT | Mod: S$GLB,,, | Performed by: INTERNAL MEDICINE

## 2025-01-24 PROCEDURE — 3008F BODY MASS INDEX DOCD: CPT | Mod: CPTII,S$GLB,, | Performed by: INTERNAL MEDICINE

## 2025-01-24 PROCEDURE — 76536 US EXAM OF HEAD AND NECK: CPT | Mod: TC

## 2025-01-24 PROCEDURE — 1160F RVW MEDS BY RX/DR IN RCRD: CPT | Mod: CPTII,S$GLB,, | Performed by: INTERNAL MEDICINE

## 2025-01-24 NOTE — ASSESSMENT & PLAN NOTE
High serum PTH, high-normal serum calcium, with normal GFR and Vitamin D confirms primary hyperparathyroidism.    Discussed surgical indications, including osteoporosis, hypercalciuria/kidney stones, elevated serum Cr. Elevated urine calcium and relatively youthfulness are her indications for surgery.    Discussed the possibility that neuropsychiatric symptoms may or may not improve after parathyroid surgery. Studies show about 50% improvement with parathyroidectomy.    She is agreeable to surgical evaluation, so will start with thyroid ultrasound. If negative, will get 4D parathyroid protocol CT. Referred to endocrine surgery.

## 2025-01-24 NOTE — TELEPHONE ENCOUNTER
Left message on patient voicemail with direct line to call back for appointment with an Endocrine Surgeon.

## 2025-01-24 NOTE — PROGRESS NOTES
NEW PATIENT VISIT    Subjective:      Chief Complaint: Primary hyperparathyroidism    HPI: Randi Khan is a 64 y.o. female who is here for hyperparathyroidism and hypercalcemia      Past Medical History:   Diagnosis Date    Arthritis of right foot     Elevated uric acid in blood     History of gastroesophageal reflux (GERD)     History of positive PPD     Not treated; Family history of TB    HTN (hypertension) 1998    Diagnosed following pregnancy; Follows with cardiology    Postmenopausal     Vitamin D deficiency          With regards to the hypercalcemia and/or primary hyperparathyroidism:    She has biochemically confirmed primary hyperparathyroidism.            Daughter had prolactinoma treated medically. No calcium issues as far as she's aware.  Sister had     Pertinent meds:    Calcium supplements: None  Vitamin D supplements: 2000 IU daily Vitamin D3    Taking magnesium supplements as well.    No   Yes  []    [x]  Thiazide and related diuretics - previously on HCTZ, but it was stopped and hypercalcemia persisted  [x]    []  Lithium    Symptoms:    No   Yes  [x]    []  Polyuria/Polydipsia  []    [x]  Depression/Anxiety  []    [x]  Mental Fog - some  [x]    []  Constipation  []    [x]  Bone pain - sometimes knee pain and fingers        Indications for surgery:    No   Yes  [x]    []  Calcium > 11.2 mg/dL  [x]    []  Osteoporosis  [x]    []  Kidney stones or nephrocalcinosis  []    [x]  Elevated 24 hour urine calcium  [x]    []  GFR <60   [x]    []  Age <50      DXA (Year)  Location  (T-scores) 7/8/2024  ([facility])   L-spine +2.7   Total Hip +1.8   Femoral Neck +1.4       NM parathyroid scan with SPECT (2/2024) did not reveal abnormal uptake to suggest a parathyroid adenoma.    Lab Results   Component Value Date    .1 (H) 10/07/2024    .4 (H) 12/29/2023    YKPUHSTN85XG 36 10/07/2024    PYNQRZLK35TH 24 (L) 10/03/2023    AXBTEOUZ15TO 22 (L) 05/27/2019    CALCIUM 10.8 (H) 10/07/2024    CALCIUM  11.0 (H) 07/05/2024    CALCIUM 11.1 (H) 12/29/2023    PHOS 2.9 12/29/2023    ALKPHOS 115 10/07/2024    ALKPHOS 113 10/03/2023    ALKPHOS 89 05/27/2019    TSH 1.383 10/07/2024    LABCALC 36.4 (H) 07/29/2024    PEVCRCQ02SSO 19 (H) 07/29/2024         Objective:     Vitals:    01/24/25 0949   BP: 134/81   Pulse: 62   Temp: 98.1 °F (36.7 °C)         BP Readings from Last 5 Encounters:   01/24/25 134/81   10/07/24 126/78   07/05/24 122/78   10/12/23 92/73   10/03/23 116/82         Physical Exam  Vitals and nursing note reviewed.   Constitutional:       General: She is not in acute distress.     Appearance: She is well-developed. She is not ill-appearing.   HENT:      Head: Normocephalic and atraumatic.   Neck:      Thyroid: No thyromegaly.      Trachea: No tracheal deviation.   Pulmonary:      Effort: Pulmonary effort is normal. No respiratory distress.   Neurological:      Mental Status: She is alert and oriented to person, place, and time.      Coordination: Coordination normal.   Psychiatric:         Mood and Affect: Mood normal.         Behavior: Behavior normal.           Wt Readings from Last 3 Encounters:   01/24/25 0949 103.8 kg (228 lb 13.4 oz)   10/07/24 0839 106.8 kg (235 lb 7.2 oz)   10/07/24 1018 107.5 kg (236 lb 15.9 oz)       Assessment/Plan:     Primary hyperparathyroidism  High serum PTH, high-normal serum calcium, with normal GFR and Vitamin D confirms primary hyperparathyroidism.    Discussed surgical indications, including osteoporosis, hypercalciuria/kidney stones, elevated serum Cr. Elevated urine calcium and relatively youthfulness are her indications for surgery.    Discussed the possibility that neuropsychiatric symptoms may or may not improve after parathyroid surgery. Studies show about 50% improvement with parathyroidectomy.    She is agreeable to surgical evaluation, so will start with thyroid ultrasound. If negative, will get 4D parathyroid protocol CT. Referred to endocrine surgery.        Hypercalcemia  See above.    Postmenopausal  Bone density was normal.      Follow up in about 6 months (around 7/24/2025). (After surgery)

## 2025-02-24 ENCOUNTER — TELEPHONE (OUTPATIENT)
Dept: SURGERY | Facility: CLINIC | Age: 65
End: 2025-02-24
Payer: COMMERCIAL

## 2025-02-24 ENCOUNTER — HOSPITAL ENCOUNTER (OUTPATIENT)
Dept: RADIOLOGY | Facility: OTHER | Age: 65
Discharge: HOME | End: 2025-02-24
Attending: SURGERY
Payer: COMMERCIAL

## 2025-02-24 ENCOUNTER — OFFICE VISIT (OUTPATIENT)
Dept: SURGERY | Facility: CLINIC | Age: 65
End: 2025-02-24
Payer: COMMERCIAL

## 2025-02-24 ENCOUNTER — HOSPITAL ENCOUNTER (OUTPATIENT)
Dept: PREADMISSION TESTING | Facility: OTHER | Age: 65
Discharge: HOME | End: 2025-02-24
Attending: SURGERY
Payer: COMMERCIAL

## 2025-02-24 ENCOUNTER — ANESTHESIA EVENT (OUTPATIENT)
Dept: SURGERY | Facility: OTHER | Age: 65
End: 2025-02-24
Payer: COMMERCIAL

## 2025-02-24 VITALS
HEIGHT: 67 IN | BODY MASS INDEX: 37.63 KG/M2 | DIASTOLIC BLOOD PRESSURE: 81 MMHG | WEIGHT: 239.75 LBS | SYSTOLIC BLOOD PRESSURE: 121 MMHG

## 2025-02-24 VITALS
OXYGEN SATURATION: 99 % | TEMPERATURE: 98 F | HEART RATE: 70 BPM | WEIGHT: 239 LBS | SYSTOLIC BLOOD PRESSURE: 140 MMHG | DIASTOLIC BLOOD PRESSURE: 70 MMHG | BODY MASS INDEX: 37.51 KG/M2 | HEIGHT: 67 IN

## 2025-02-24 DIAGNOSIS — E21.3 HYPERPARATHYROIDISM, UNSPECIFIED: ICD-10-CM

## 2025-02-24 DIAGNOSIS — E21.0 PRIMARY HYPERPARATHYROIDISM: ICD-10-CM

## 2025-02-24 DIAGNOSIS — E21.0 PRIMARY HYPERPARATHYROIDISM: Primary | ICD-10-CM

## 2025-02-24 LAB
ALBUMIN SERPL BCP-MCNC: 4.5 G/DL (ref 3.5–5.2)
ALP SERPL-CCNC: 126 U/L (ref 40–150)
ALT SERPL W/O P-5'-P-CCNC: 22 U/L (ref 10–44)
ANION GAP SERPL CALC-SCNC: 10 MMOL/L (ref 8–16)
AST SERPL-CCNC: 19 U/L (ref 10–40)
BASOPHILS # BLD AUTO: 0.04 K/UL (ref 0–0.2)
BASOPHILS NFR BLD: 0.6 % (ref 0–1.9)
BILIRUB SERPL-MCNC: 0.6 MG/DL (ref 0.1–1)
BUN SERPL-MCNC: 15 MG/DL (ref 8–23)
CALCIUM SERPL-MCNC: 10.8 MG/DL (ref 8.7–10.5)
CHLORIDE SERPL-SCNC: 108 MMOL/L (ref 95–110)
CO2 SERPL-SCNC: 25 MMOL/L (ref 23–29)
CREAT SERPL-MCNC: 0.9 MG/DL (ref 0.5–1.4)
DIFFERENTIAL METHOD BLD: ABNORMAL
EOSINOPHIL # BLD AUTO: 0.2 K/UL (ref 0–0.5)
EOSINOPHIL NFR BLD: 2.5 % (ref 0–8)
ERYTHROCYTE [DISTWIDTH] IN BLOOD BY AUTOMATED COUNT: 12.6 % (ref 11.5–14.5)
EST. GFR  (NO RACE VARIABLE): >60 ML/MIN/1.73 M^2
GLUCOSE SERPL-MCNC: 83 MG/DL (ref 70–110)
HCT VFR BLD AUTO: 39.1 % (ref 37–48.5)
HGB BLD-MCNC: 12.9 G/DL (ref 12–16)
IMM GRANULOCYTES # BLD AUTO: 0.02 K/UL (ref 0–0.04)
IMM GRANULOCYTES NFR BLD AUTO: 0.3 % (ref 0–0.5)
LYMPHOCYTES # BLD AUTO: 4.3 K/UL (ref 1–4.8)
LYMPHOCYTES NFR BLD: 58.7 % (ref 18–48)
MCH RBC QN AUTO: 31.2 PG (ref 27–31)
MCHC RBC AUTO-ENTMCNC: 33 G/DL (ref 32–36)
MCV RBC AUTO: 95 FL (ref 82–98)
MONOCYTES # BLD AUTO: 0.5 K/UL (ref 0.3–1)
MONOCYTES NFR BLD: 7.2 % (ref 4–15)
NEUTROPHILS # BLD AUTO: 2.2 K/UL (ref 1.8–7.7)
NEUTROPHILS NFR BLD: 30.7 % (ref 38–73)
NRBC BLD-RTO: 0 /100 WBC
PLATELET # BLD AUTO: 239 K/UL (ref 150–450)
PMV BLD AUTO: 10.7 FL (ref 9.2–12.9)
POTASSIUM SERPL-SCNC: 4.3 MMOL/L (ref 3.5–5.1)
PROT SERPL-MCNC: 7.8 G/DL (ref 6–8.4)
PTH-INTACT SERPL-MCNC: 267.7 PG/ML (ref 9–77)
RBC # BLD AUTO: 4.13 M/UL (ref 4–5.4)
SODIUM SERPL-SCNC: 143 MMOL/L (ref 136–145)
WBC # BLD AUTO: 7.24 K/UL (ref 3.9–12.7)

## 2025-02-24 PROCEDURE — 99204 OFFICE O/P NEW MOD 45 MIN: CPT | Mod: S$GLB,,, | Performed by: SURGERY

## 2025-02-24 PROCEDURE — 99999 PR PBB SHADOW E&M-EST. PATIENT-LVL V: CPT | Mod: PBBFAC,,, | Performed by: SURGERY

## 2025-02-24 PROCEDURE — 3008F BODY MASS INDEX DOCD: CPT | Mod: CPTII,S$GLB,, | Performed by: SURGERY

## 2025-02-24 PROCEDURE — 85025 COMPLETE CBC W/AUTO DIFF WBC: CPT | Performed by: SURGERY

## 2025-02-24 PROCEDURE — 70492 CT SFT TSUE NCK W/O & W/DYE: CPT | Mod: TC

## 2025-02-24 PROCEDURE — 83970 ASSAY OF PARATHORMONE: CPT | Performed by: SURGERY

## 2025-02-24 PROCEDURE — 80053 COMPREHEN METABOLIC PANEL: CPT | Performed by: SURGERY

## 2025-02-24 PROCEDURE — 3079F DIAST BP 80-89 MM HG: CPT | Mod: CPTII,S$GLB,, | Performed by: SURGERY

## 2025-02-24 PROCEDURE — 3074F SYST BP LT 130 MM HG: CPT | Mod: CPTII,S$GLB,, | Performed by: SURGERY

## 2025-02-24 PROCEDURE — 25500020 PHARM REV CODE 255: Performed by: SURGERY

## 2025-02-24 PROCEDURE — 36415 COLL VENOUS BLD VENIPUNCTURE: CPT | Performed by: SURGERY

## 2025-02-24 PROCEDURE — 70492 CT SFT TSUE NCK W/O & W/DYE: CPT | Mod: 26,,, | Performed by: RADIOLOGY

## 2025-02-24 RX ORDER — SODIUM CHLORIDE, SODIUM LACTATE, POTASSIUM CHLORIDE, CALCIUM CHLORIDE 600; 310; 30; 20 MG/100ML; MG/100ML; MG/100ML; MG/100ML
INJECTION, SOLUTION INTRAVENOUS CONTINUOUS
OUTPATIENT
Start: 2025-02-24

## 2025-02-24 RX ORDER — SODIUM CHLORIDE 9 MG/ML
INJECTION, SOLUTION INTRAVENOUS CONTINUOUS
OUTPATIENT
Start: 2025-02-24

## 2025-02-24 RX ORDER — LIDOCAINE HYDROCHLORIDE 10 MG/ML
0.5 INJECTION, SOLUTION EPIDURAL; INFILTRATION; INTRACAUDAL; PERINEURAL ONCE
OUTPATIENT
Start: 2025-02-24 | End: 2025-02-24

## 2025-02-24 RX ADMIN — IOHEXOL 100 ML: 350 INJECTION, SOLUTION INTRAVENOUS at 06:02

## 2025-02-24 NOTE — ANESTHESIA PREPROCEDURE EVALUATION
02/24/2025  Randi Khan is a 65 y.o., female.      Pre-op Assessment    I have reviewed the Patient Summary Reports.     I have reviewed the Nursing Notes. I have reviewed the NPO Status.   I have reviewed the Medications.     Review of Systems  Anesthesia Hx:  No previous Anesthesia             Denies Family Hx of Anesthesia complications.    Denies Personal Hx of Anesthesia complications.                    Social:  Non-Smoker, Social Alcohol Use       Hematology/Oncology:  Hematology Normal   Oncology Normal                Hematology Comments: Pt is Jehovahs Witness                    EENT/Dental:  EENT/Dental Normal           Cardiovascular:     Hypertension                                          Pulmonary:  Pulmonary Normal                       Renal/:  Renal/ Normal                 Hepatic/GI:     GERD, well controlled                Musculoskeletal:  Arthritis               Neurological:           Tingling in rt hand      Peripheral Neuropathy                          Endocrine:  Endocrine Normal          Obesity / BMI > 30  Dermatological:  Skin Normal    Psych:  Psychiatric History                  Physical Exam  General: Cooperative, Alert and Oriented    Airway:  Mallampati: II   Mouth Opening: Normal  Neck ROM: Normal ROM    Dental:  Intact    Anesthesia Plan  Type of Anesthesia, risks & benefits discussed:    Anesthesia Type: Gen ETT  Intra-op Monitoring Plan: Standard ASA Monitors and Art Line  Post Op Pain Control Plan: multimodal analgesia  Induction:  IV  Airway Plan: Video  Informed Consent: Informed consent signed with the Patient and all parties understand the risks and agree with anesthesia plan.  All questions answered.   ASA Score: 3  Anesthesia Plan Notes: Pt is JEHOVAH WITNESS!!  Labs today  Tingling in rt hand!    Ready For Surgery From Anesthesia Perspective.      .

## 2025-02-24 NOTE — DISCHARGE INSTRUCTIONS
Information to Prepare you for your Surgery    PRE-ADMIT TESTING -  [phone]    [address]  [facility]          Your surgery has been scheduled at [facility]. We are pleased to have the opportunity to serve you. For Further Information please call [phone].    On the day of surgery please report to the Information Desk on the 1st floor.    CONTACT YOUR PHYSICIAN'S OFFICE THE DAY PRIOR TO YOUR SURGERY TO OBTAIN YOUR ARRIVAL TIME.     The evening before surgery do not eat anything after 9 p.m. ( this includes hard candy, chewing gum and mints).  You may only have GATORADE, POWERADE AND WATER  from 9 p.m. until you leave your home.   DO NOT DRINK ANY LIQUIDS ON THE WAY TO THE HOSPITAL.      Why does your anesthesiologist allow you to drink Gatorade/Powerade before surgery?  Gatorade/Powerade helps to increase your comfort before surgery and to decrease your nausea after surgery. The carbohydrates in Gatorade/Powerade help reduce your body's stress response to surgery.  If you are a diabetic-drink only water prior to surgery.    Outpatient Surgery- May allow 2 adult (18 and older) Support Persons (1 being the designated ) for all surgical/procedural patients. A breastfeeding mother will be allowed her infant and 2 adult Support Persons. No one under the age of 18 will be allowed in the building. No swapping out of visitors in the [facility].      SPECIAL MEDICATION INSTRUCTIONS: TAKE medications checked off by the Anesthesiologist on your Medication List.    Angiogram Patients: Take medications as instructed by your physician, including aspirin.     Surgery Patients:    If you take ASPIRIN - Your PHYSICIAN/SURGEON will need to inform you IF/OR when you need to stop taking aspirin prior to your surgery.     The week prior to surgery do not ot take any medications containing IBUPROFEN or NSAIDS ( Advil, Motrin, Goodys, BC, Aleve, Naproxen etc)  If you are not sure if you should take a medicine please call your surgeon's office.  Ok to take Tylenol    Do Not Wear any make-up (especially eye make-up) to surgery. Please remove any false eyelashes or eyelash extensions. If you arrive the day of surgery with makeup/eyelashes on you will be required to remove prior to surgery. (There is a risk of corneal abrasions if eye makeup/eyelash extensions are not removed)      Leave all valuables at home.   Do Not wear any jewelry or watches, including any metal in body piercings. Jewelry must be removed prior to coming to the hospital.  There is a possibility that rings that are unable to be removed may be cut off if they are on the surgical extremity.    Please remove all hair extensions, wigs, clips and any other metal accessories/ ornaments from your hair.  These items may pose a flammable/fire risk in Surgery and must be removed.    Do not shave your surgical area at least 5 days prior to your surgery. The surgical prep will be performed at the hospital according to Infection Control regulations.    Contact Lens must be removed before surgery. Either do not wear the contact lens or bring a case and solution for storage.  Please bring a container for eyeglasses or dentures as required.  Bring any paperwork your physician has provided, such as consent forms,  history and physicals, doctor's orders, etc.   Bring comfortable clothes that are loose fitting to wear upon discharge. Take into consideration the type of surgery being performed.  Maintain your diet as advised per your physician the day prior to surgery.      Adequate rest the night before surgery is advised.   Park in the Parking lot behind the hospital or in the [facility] across the street from the parking lot. Parking is complimentary.  If you will be discharged the same day as your procedure, please arrange for a responsible adult to drive you home or to accompany you if traveling by taxi.    YOU WILL NOT BE PERMITTED TO DRIVE OR TO LEAVE THE HOSPITAL ALONE AFTER SURGERY.   If you are being discharged the same day, it is strongly recommended that you arrange for someone to remain with you for the first 24 hrs following your surgery.    The Surgeon will speak to your family/visitor after your surgery regarding the outcome of your surgery and post op care.  The Surgeon may speak to you after your surgery, but there is a possibility you may not remember the details.  Please check with your family members regarding the conversation with the Surgeon.    We strongly recommend whoever is bringing you home be present for discharge instructions.  This will ensure a thorough understanding for your post op home care.    If the patient has fever, cough, or signs/symptoms of Flu or Covid please do not come in for your surgery. Contact your surgeon and your primary care physician for further instructions.           Thank you for your cooperation.  The Staff of [facility].            Bathing Instructions with Hibiclens    Shower the evening before and morning of your procedure with Chlorhexidine (Hibiclens)  do not use Chlorhexidine on your face or genitals. Do not get in your eyes.  Wash your face with water and your regular face wash/soap  Use your regular shampoo  Apply Chlorhexidine (Hibiclens) directly on your skin or on a wet washcloth and wash gently. When showering: Move away from the shower stream when applying Chlorhexidine (Hibiclens) to avoid rinsing off too soon.  Rinse thoroughly with warm water  Do not dilute Chlorhexidine (Hibiclens)   Dry off as usual, do not use any deodorant, powder, body lotions, perfume, after shave or cologne.

## 2025-02-24 NOTE — PROGRESS NOTES
Consult Note  Endocrine Surgery    Visit Diagnosis: Primary hyperparathyroidism [E21.0]    SUBJECTIVE:     Patient is a 65 y.o. female who was referred by Dr. David Queen and is here unaccompanied and presents for evaluation of primary hyperparathyroidism. The patient has had complaints of elevation of the serum calcium and parathormone(with calcium intermittently elevated since 2018 and more recently elevated up to 11.1 in 2023). There is history thiazide medication use(stopped thiazide though hypercalcemia persisted). No prior lithium use.  She has not had previous history of head or neck radiation. She admits mood changes and increased thirst. She denies joint pain, abdominal pain, constipation, weakness, lethargy, and increased urination. Furthermore, there is no history of pathologic fractures, malignancy, or personal history of thyroid, adrenal, or pancreatic abnormalities. The patient is vitamin D replete(36 in 10/2024).  She does have familial history of endocrinopathies(Daughter with hyperthyroidism? at 5 years of age tx with SIERRA and sister had possible prolactinoma).  Sister with pituitary problem.      Review of patient's allergies indicates:  No Known Allergies    Current Medications[1]    Past Medical History:   Diagnosis Date    Arthritis of right foot     Elevated uric acid in blood     History of gastroesophageal reflux (GERD)     History of positive PPD     Not treated; Family history of TB    HTN (hypertension) 1998    Diagnosed following pregnancy; Follows with cardiology    Postmenopausal     Vitamin D deficiency      Past Surgical History:   Procedure Laterality Date    COLONOSCOPY N/A 10/12/2023    Procedure: COLONOSCOPY;  Surgeon: Cecy Nuñez MD;  Location: [facility];  Service: Colon and Rectal;  Laterality: N/A;    None       Social History[2]       Review of Systems:    Review of Systems   All other systems reviewed and are negative.        OBJECTIVE:     Vital Signs:  BP  "121/81   Ht 5' 7" (1.702 m)   Wt 108.7 kg (239 lb 12 oz)   BMI 37.55 kg/m²    Body mass index is 37.55 kg/m².      Physical Exam    General:  no distress, see vitals for BMI    Eyes:  conjunctivae/corneas clear   Neck: trachea midline and symmetric, no adenopathy    Thyroid:  thyroid not enlarged   Lung: clear to auscultation bilaterally   Heart:  regular rate and rhythm   Abdomen: soft, non-tender; bowel sounds normal; no masses,  no organomegaly   Skin/Extremities: warm and well-perfused   Pulses: 2+ and symmetric   Neuro: normal without focal findings and mental status, speech normal, alert and oriented x3       Imaging    Studies:  Date:       Results:     DEXA:     7/8/24:   Spine T Score: +2.7,  Femoral neck T Score: +1.4   Ultrasound:  1/24/25 FINDINGS:  The thyroid gland is normal in size.  The right lobe measures 4.9 x 1.6 x 1.4 cm and the left lobe measures 3.7 x 0.9 x 1.4 cm.  The total thyroid volume is 8.1 cc.     The thyroid parenchyma has a homogeneous echotexture.  There is a 14 mm isoechoic solid lesion adjacent to the lower pole of the right lobe, possibly exophytic thyroid nodule versus parathyroid lesion.  No focal lesions in the left lobe.  No cervical lymphadenopathy on either side.     Impression:     Exophytic thyroid nodule versus parathyroid lesion adjacent to lower pole right thyroid lobe.  No other findings.      Sestamebi/Parathyroid scan:  2/19/24 FINDINGS:  There is physiological tracer uptake in the myocardium, salivary glands, and thyroid gland. Delayed images demonstrate near-complete tracer washout from the thyroid.     No focal abnormal persistent uptake is present on delayed images in the region of the parathyroid glands to suggest parathyroid adenoma.     Impression:     No abnormal uptake to suggest parathyroid adenoma.   Parathyroid protocol CT scan:    pending       Lab Review    24-Hour Urine Collection:  Date: 7/29/24    Urine Calcium:    36.4 mg/dL           Component " Value Date    Vit D, 25-Hydroxy 36 10/07/2024    PTH, Intact 193.1 (H) 10/07/2024    Calcium 10.8 (H) 10/07/2024    Phosphorus 2.9 12/29/2023    TSH 1.383 10/07/2024           ASSESSMENT/PLAN:       Assessment    Randi Khan is an 65 y.o. female who presents withprimary hyperparathyroidism. The above testing and examination support the diagnosis. Patient meets criteria for recommending parathyroid surgery. This is based on her history of  increased urine calcium . She currently has possible localization to the right] via imaging (Ultrasound).       Plan    Imaging: will obtain a Parathyroid protocol CT Scan.  Medications: patient should continue current medications: OTC Vitamin D  The natural history and treatment options for primary hyperparathyroidism were discussed with the patient. Parathyroidectomy is the only curative treatment for primary hyperparathyroidism. Parathyroidectomy, both minimally invasive technique and standard four-gland exploration, and its risks were discussed. I was also able to duscuss the expected papo-operative course and the risks of surgery including failure to localize the parathyroid gland, persistent or recurrent hyperparathyroidism with the possibility of the need for a second surgery, temporary or permanent hypoparathyroidism resulting in low blood calcium levels that require extensive medication to avoid serious degenerative conditions such as cataracts, brittle bones, muscle weakness and muscle irritability. Other risks include bleeding, infection, injury to the recurrent laryngeal nerves resulting in hoarseness or impairment of speech and the risks of general anesthetic including MI, CVA, sudden death or even reaction to anesthetic medications.The role of intraoperative PTH monitoring was also discussed. The patient understands the risks, any and all questions were answered to the patients satisfaction. The patient is amenable to surgery. Written consent was obtained and   "parathyroid  surgery is scheduled for the near future.  Will ensure that patient is medically optimized prior to procedure. In addition, the patient was given our "Understanding Parathyroid Disease" handout and directed to the [employer] Patient Education portal as a resource.     I spent a total of 52 minutes on the day of the visit.This includes face to face time and non-face to face time preparing to see the patient (eg, review of tests), obtaining and/or reviewing separately obtained history, documenting clinical information in the electronic or other health record, independently interpreting results and communicating results to the patient/family/caregiver, or care coordinator.           [1]   Current Outpatient Medications   Medication Sig Dispense Refill    allopurinoL (ZYLOPRIM) 100 MG tablet TAKE 1 TABLET(100 MG) BY MOUTH EVERY DAY 90 tablet 1    amLODIPine (NORVASC) 5 MG tablet TAKE 1 TABLET(5 MG) BY MOUTH DAILY 90 tablet 3    furosemide (LASIX) 20 MG tablet Take 1/2 pill by mouth daily 30 tablet 5    losartan (COZAAR) 50 MG tablet TAKE 1 TABLET(50 MG) BY MOUTH DAILY 90 tablet 3     No current facility-administered medications for this visit.   [2]   Social History  Tobacco Use    Smoking status: Never     Passive exposure: Never    Smokeless tobacco: Never   Substance Use Topics    Alcohol use: Yes     Alcohol/week: 1.0 standard drink of alcohol     Types: 1 Glasses of wine per week     Comment: rare    Drug use: No     "

## 2025-02-26 ENCOUNTER — RESULTS FOLLOW-UP (OUTPATIENT)
Dept: SURGERY | Facility: OTHER | Age: 65
End: 2025-02-26
Payer: COMMERCIAL

## 2025-02-28 ENCOUNTER — TELEPHONE (OUTPATIENT)
Dept: SURGERY | Facility: CLINIC | Age: 65
End: 2025-02-28
Payer: COMMERCIAL

## 2025-03-28 ENCOUNTER — OFFICE VISIT (OUTPATIENT)
Dept: FAMILY MEDICINE | Facility: CLINIC | Age: 65
End: 2025-03-28
Attending: FAMILY MEDICINE
Payer: COMMERCIAL

## 2025-03-28 DIAGNOSIS — E66.9 OBESITY (BMI 30-39.9): ICD-10-CM

## 2025-03-28 DIAGNOSIS — Z76.0 ENCOUNTER FOR MEDICATION REFILL: ICD-10-CM

## 2025-03-28 DIAGNOSIS — R09.82 ALLERGIC RHINITIS WITH POSTNASAL DRIP: ICD-10-CM

## 2025-03-28 DIAGNOSIS — F32.A ANXIETY AND DEPRESSION: ICD-10-CM

## 2025-03-28 DIAGNOSIS — R60.9 EDEMA, UNSPECIFIED TYPE: ICD-10-CM

## 2025-03-28 DIAGNOSIS — J30.9 ALLERGIC RHINITIS WITH POSTNASAL DRIP: ICD-10-CM

## 2025-03-28 DIAGNOSIS — E79.0 HYPERURICEMIA: ICD-10-CM

## 2025-03-28 DIAGNOSIS — F41.9 ANXIETY AND DEPRESSION: ICD-10-CM

## 2025-03-28 DIAGNOSIS — I10 HYPERTENSION, UNSPECIFIED TYPE: Primary | ICD-10-CM

## 2025-03-28 PROCEDURE — 3075F SYST BP GE 130 - 139MM HG: CPT | Mod: CPTII,S$GLB,,

## 2025-03-28 PROCEDURE — 99213 OFFICE O/P EST LOW 20 MIN: CPT | Mod: S$GLB,,,

## 2025-03-28 PROCEDURE — 99999 PR PBB SHADOW E&M-EST. PATIENT-LVL III: CPT | Mod: PBBFAC,,,

## 2025-03-28 PROCEDURE — 3078F DIAST BP <80 MM HG: CPT | Mod: CPTII,S$GLB,,

## 2025-03-28 PROCEDURE — 1160F RVW MEDS BY RX/DR IN RCRD: CPT | Mod: CPTII,S$GLB,,

## 2025-03-28 PROCEDURE — 1159F MED LIST DOCD IN RCRD: CPT | Mod: CPTII,S$GLB,,

## 2025-03-28 PROCEDURE — 4010F ACE/ARB THERAPY RXD/TAKEN: CPT | Mod: CPTII,S$GLB,,

## 2025-03-28 PROCEDURE — 3008F BODY MASS INDEX DOCD: CPT | Mod: CPTII,S$GLB,,

## 2025-03-28 RX ORDER — LOSARTAN POTASSIUM 50 MG/1
50 TABLET ORAL DAILY
Qty: 90 TABLET | Refills: 1 | Status: SHIPPED | OUTPATIENT
Start: 2025-03-28

## 2025-03-28 RX ORDER — MONTELUKAST SODIUM 10 MG/1
10 TABLET ORAL NIGHTLY
Qty: 30 TABLET | Refills: 0 | Status: SHIPPED | OUTPATIENT
Start: 2025-03-28 | End: 2025-04-27

## 2025-03-28 RX ORDER — AMLODIPINE BESYLATE 5 MG/1
5 TABLET ORAL DAILY
Qty: 90 TABLET | Refills: 1 | Status: SHIPPED | OUTPATIENT
Start: 2025-03-28

## 2025-03-28 RX ORDER — PROMETHAZINE HYDROCHLORIDE AND DEXTROMETHORPHAN HYDROBROMIDE 6.25; 15 MG/5ML; MG/5ML
5 SYRUP ORAL
Qty: 220 ML | Refills: 0 | Status: SHIPPED | OUTPATIENT
Start: 2025-03-28

## 2025-03-28 RX ORDER — FLUTICASONE PROPIONATE 50 MCG
1 SPRAY, SUSPENSION (ML) NASAL DAILY
Qty: 16 G | Refills: 2 | Status: SHIPPED | OUTPATIENT
Start: 2025-03-28

## 2025-03-28 RX ORDER — ALLOPURINOL 100 MG/1
100 TABLET ORAL DAILY
Qty: 90 TABLET | Refills: 1 | Status: SHIPPED | OUTPATIENT
Start: 2025-03-28

## 2025-03-28 RX ORDER — FUROSEMIDE 20 MG/1
TABLET ORAL
Qty: 30 TABLET | Refills: 5 | Status: SHIPPED | OUTPATIENT
Start: 2025-03-28

## 2025-03-28 NOTE — PROGRESS NOTES
Randi Khan  MRN:  [id number]  65 y.o. female      SUBJECTIVE:     CHIEF COMPLAINT:  - Pre-op clearance      Procedure/Surgery:   Surgeon/MD:    DOS:     Anesthesia:  []  General     []  Regional     []  MAC     []  Local  H/O Anesthesia Rxn: []  NO     []  YES  Pre-op Orders:  []  None  []  EKG     []  CXR     []  Blood work     []  Urine     []  MRSA/MSSA swab     []  Other:      REVIEW OF SYSTEMS:  Review of Systems      ALLERGIES:  Review of patient's allergies indicates:  No Known Allergies    CURRENT PROBLEM LIST:  Problem List[1]    CURRENT MEDICATIONS:  Current Outpatient Medications   Medication Instructions    allopurinoL (ZYLOPRIM) 100 mg, Oral    amLODIPine (NORVASC) 5 mg, Oral    furosemide (LASIX) 20 MG tablet Take 1/2 pill by mouth daily    losartan (COZAAR) 50 MG tablet TAKE 1 TABLET(50 MG) BY MOUTH DAILY         HISTORY:    PAST MEDICAL HISTORY:  Past Medical History:   Diagnosis Date    Arthritis of right foot     Elevated uric acid in blood     History of gastroesophageal reflux (GERD)     History of positive PPD     Not treated; Family history of TB    HTN (hypertension) 1998    Diagnosed following pregnancy; Follows with cardiology    Postmenopausal     Vitamin D deficiency        PAST SURGICAL HISTORY:  Past Surgical History:   Procedure Laterality Date    COLONOSCOPY N/A 10/12/2023    Procedure: COLONOSCOPY;  Surgeon: Cecy Nuñez MD;  Location: [facility];  Service: Colon and Rectal;  Laterality: N/A;    None         FAMILY HISTORY:  Family History   Problem Relation Name Age of Onset    Hypertension Mother      Hypertension Father      Heart failure Sister      Other Sister          diabetes during pregnancy    Tuberculosis Paternal Uncle      Cancer Cousin      Thyroid disease Daughter      Allergies Daughter      Hyperlipidemia Neg Hx      Stroke Neg Hx      Rheum arthritis Neg Hx         SOCIAL HISTORY:  Social History[2]      OBJECTIVE:     VITAL SIGNS:  There were no vitals  filed for this visit.      PHYSICAL EXAM:  Physical Exam      ASSESSMENT:     1. Anxiety and depression  ***    2. Hypertension, unspecified type  ***    3. Obesity (BMI 30-39.9)  ***          PLAN:     Surgical clearance pending.  Denies smoking or alcohol intake.  No alcohol intake for at least 72 hrs before surgery.  Stop smoking as soon as possible prior to surgery.  Medication instructions:    Hold ASA, ASA-containing products and oral NSAIDs x 7 days before surgery.  Hold vitamins, herbals and supplements x 1 to 2 weeks before surgery.  RTC as directed and/or prn        ARTHUR Hilton  [facility]       {ADCVISITMINUTES:[id number]} of total time spent on the encounter, which includes face to face time and non-face to face time preparing to see the patient.  This includes obtaining and/or reviewing separately obtained history, performing a medically appropriate examination and/or evaluation, and counseling and educating the patient/family/caregiver.  Includes documenting clinical information in the electronic or other health record, independently interpreting results (not separately reported) and communicating results to the patient/family/caregiver, with care coordination (not separately reported).  Medications, tests and/or procedures ordered as necessary along with referring and communicating with other health professionals (when not separately reported).             [1]   Patient Active Problem List  Diagnosis    Hypertension    Chronic foot pain, right    Ankle swelling, right    Postmenopausal    Obesity (BMI 30-39.9)    Fatigue    Vitamin D deficiency    Arthritis of foot    Anxiety and depression    Colon cancer screening    Hypercalcemia    Severe obesity (BMI 35.0-39.9) with comorbidity    Other secondary pulmonary hypertension    Primary hyperparathyroidism    Hyperuricemia   [2]   Social History  Tobacco Use    Smoking status: Never     Passive exposure: Never    Smokeless  tobacco: Never   Substance Use Topics    Alcohol use: Yes     Alcohol/week: 1.0 standard drink of alcohol     Types: 1 Glasses of wine per week     Comment: rare    Drug use: No

## 2025-03-28 NOTE — PROGRESS NOTES
Randi Khan  03/31/2025  [id number]    Disha Bright MD  Patient Care Team:  Disha Bright MD as PCP - General     Subjective      Chief Complaint:  Chief Complaint   Patient presents with    Hypertension    Follow-up       History of Present Illness:    Ms. Khan presents today for blood pressure follow-up. She is currently taking amlodipine 5 mg, furosemide 20 mg, and losartan 50 mg for blood pressure management. She requires medication refills for all blood pressure medications.  We discussed enrolling into the digital medicine program for better management of her pressure. Patient agrees to try.     While in office, Ms. Khan reports a 2-week history of cough with tickle in throat and postnasal drip, which worsens at night. Symptoms improve with allergy medication and upon leaving certain environments.    Review of Systems   Constitutional:  Negative for fever.   HENT:  Positive for postnasal drip and sore throat. Negative for nasal congestion and rhinorrhea.    Eyes:  Negative for visual disturbance.   Respiratory:  Negative for shortness of breath.    Cardiovascular:  Negative for chest pain.   Neurological:  Negative for dizziness, syncope, facial asymmetry, light-headedness and headaches.        History:  Past Medical History:   Diagnosis Date    Arthritis of right foot     Elevated uric acid in blood     History of gastroesophageal reflux (GERD)     History of positive PPD     Not treated; Family history of TB    HTN (hypertension) 1998    Diagnosed following pregnancy; Follows with cardiology    Postmenopausal     Vitamin D deficiency      Past Surgical History:   Procedure Laterality Date    COLONOSCOPY N/A 10/12/2023    Procedure: COLONOSCOPY;  Surgeon: Cecy Nuñez MD;  Location: [facility];  Service: Colon and Rectal;  Laterality: N/A;    None       Family History   Problem Relation Name Age of Onset    Hypertension Mother      Hypertension Father      Heart failure Sister      Other  Sister          diabetes during pregnancy    Tuberculosis Paternal Uncle      Cancer Cousin      Thyroid disease Daughter      Allergies Daughter      Hyperlipidemia Neg Hx      Stroke Neg Hx      Rheum arthritis Neg Hx       Social History[1]     Review of patient's allergies indicates:  No Known Allergies    **The following were reviewed at this visit: active problem list, medication list, allergies, family history, social history, and health maintenance.    Medications:  Medications Ordered Prior to Encounter[2]    **Medications have been reviewed and reconciled with patient at this visit.            Objective      Vitals:    03/28/25 1704   BP: 136/78   Temp:       Body mass index is 37.91 kg/m².    Wt Readings from Last 3 Encounters:   03/28/25 109.8 kg (242 lb 1 oz)   02/24/25 108.4 kg (239 lb)   02/24/25 108.7 kg (239 lb 12 oz)     Temp Readings from Last 3 Encounters:   03/28/25 97.8 °F (36.6 °C) (Tympanic)   02/24/25 97.5 °F (36.4 °C) (Temporal)   01/24/25 98.1 °F (36.7 °C) (Temporal)     BP Readings from Last 3 Encounters:   03/28/25 136/78   02/24/25 (!) 140/70   02/24/25 121/81     Pulse Readings from Last 3 Encounters:   02/24/25 70   01/24/25 62   10/07/24 (!) 55         Laboratory Reviewed ({Yes)  Lab Results   Component Value Date    WBC 7.24 02/24/2025    HGB 12.9 02/24/2025    HCT 39.1 02/24/2025     02/24/2025    CHOL 128 10/07/2024    TRIG 44 10/07/2024    HDL 50 10/07/2024    ALT 22 02/24/2025    AST 19 02/24/2025     02/24/2025    K 4.3 02/24/2025     02/24/2025    CREATININE 0.9 02/24/2025    BUN 15 02/24/2025    CO2 25 02/24/2025    TSH 1.383 10/07/2024    HGBA1C 5.4 10/07/2024       Physical Exam  Vitals reviewed.   Constitutional:       Appearance: Normal appearance.   HENT:      Head: Normocephalic and atraumatic.      Right Ear: Tympanic membrane normal.      Left Ear: Tympanic membrane normal.      Nose: Rhinorrhea present.      Mouth/Throat:      Mouth: Mucous  membranes are moist.      Pharynx: Oropharynx is clear. Postnasal drip present.   Eyes:      Extraocular Movements: Extraocular movements intact.      Conjunctiva/sclera: Conjunctivae normal.      Pupils: Pupils are equal, round, and reactive to light.   Cardiovascular:      Rate and Rhythm: Normal rate and regular rhythm.      Pulses: Normal pulses.      Heart sounds: Normal heart sounds.   Pulmonary:      Effort: Pulmonary effort is normal.      Breath sounds: Normal breath sounds.   Abdominal:      General: Bowel sounds are normal.      Palpations: Abdomen is soft.   Skin:     General: Skin is warm.   Neurological:      General: No focal deficit present.      Mental Status: She is alert and oriented to person, place, and time.   Psychiatric:         Mood and Affect: Mood normal.         Behavior: Behavior normal.         Thought Content: Thought content normal.         Judgment: Judgment normal.               Assessment      Randi was seen today for hypertension and follow-up.    Diagnoses and all orders for this visit:    Hypertension, unspecified type (stable)  -     losartan (COZAAR) 50 MG tablet; Take 1 tablet (50 mg total) by mouth once daily.  -     amLODIPine (NORVASC) 5 MG tablet; Take 1 tablet (5 mg total) by mouth once daily.  -     furosemide (LASIX) 20 MG tablet; Take 1/2 pill by mouth daily  -     Hypertension Digital Medicine (HDMP) Enrollment Order    Allergic rhinitis with postnasal drip  -     promethazine-dextromethorphan (PROMETHAZINE-DM) 6.25-15 mg/5 mL Syrp; Take 5 mLs by mouth every 4 to 6 hours as needed (cough and congestion).  -     montelukast (SINGULAIR) 10 mg tablet; Take 1 tablet (10 mg total) by mouth every evening.  -     fluticasone propionate (FLONASE) 50 mcg/actuation nasal spray; 1 spray (50 mcg total) by Each Nostril route once daily.    Encounter for medication refill   - requested medications renewed    Edema, unspecified type (Stable)  -     furosemide (LASIX) 20 MG tablet;  Take 1/2 pill by mouth daily    Hyperuricemia (stable)  -     allopurinoL (ZYLOPRIM) 100 MG tablet; Take 1 tablet (100 mg total) by mouth once daily.    Obesity (BMI 30-39.9)   - healthy lifestyle and diet modifications advised        Plan      1) meds renewed  2) fluticasone, Singulair, promethazine-DM PRN  3) digital medicine enrollment order placed  4) healthy lifestyle and diet  modifications advised    Follow up: Follow up in about 6 months (around 9/28/2025) for HTN follow up.    **After visit summary was printed and given to patient upon discharge today.  Patient goals and care plan are included in After Visit Summary.    I spent a total of 25 minutes on the day of the visit.This includes face to face time and non-face to face time preparing to see the patient (eg, review of tests), obtaining and/or reviewing separately obtained history, documenting clinical information in the electronic or other health record, independently interpreting results and communicating results to the patient/family/caregiver, or care coordinator.   .         Dawn Felix, MSN, APRN, FNP-C         [1]   Social History  Socioeconomic History    Marital status:    Occupational History    Occupation:      Employer: [employer]   Tobacco Use    Smoking status: Never     Passive exposure: Never    Smokeless tobacco: Never   Substance and Sexual Activity    Alcohol use: Yes     Alcohol/week: 1.0 standard drink of alcohol     Types: 1 Glasses of wine per week     Comment: rare    Drug use: No    Sexual activity: Not Currently     Partners: Male     Birth control/protection: Post-menopausal   Social History Narrative    She wears seatbelt. 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     Social Drivers of Health     Financial Resource Strain: Low Risk  (7/1/2024)    Overall Financial Resource Strain (CARDIA)     Difficulty of Paying Living Expenses: Not very hard   Food Insecurity: No Food Insecurity  (7/1/2024)    Hunger Vital Sign     Worried About Running Out of Food in the Last Year: Never true     Ran Out of Food in the Last Year: Never true   Transportation Needs: No Transportation Needs (9/28/2023)    PRAPARE - Transportation     Lack of Transportation (Medical): No     Lack of Transportation (Non-Medical): No   Physical Activity: Inactive (10/7/2024)    Exercise Vital Sign     Days of Exercise per Week: 5 days     Minutes of Exercise per Session: 0 min   Stress: No Stress Concern Present (7/1/2024)    [employer] [place] [employer] - Occupational Stress Questionnaire     Feeling of Stress : Only a little   Housing Stability: Low Risk  (9/28/2023)    Housing Stability Vital Sign     Unable to Pay for Housing in the Last Year: No     Number of Places Lived in the Last Year: 1     Unstable Housing in the Last Year: No   [2]   No current outpatient medications on file prior to visit.     No current facility-administered medications on file prior to visit.

## 2025-03-31 VITALS
WEIGHT: 242.06 LBS | TEMPERATURE: 98 F | HEIGHT: 67 IN | BODY MASS INDEX: 37.99 KG/M2 | SYSTOLIC BLOOD PRESSURE: 136 MMHG | DIASTOLIC BLOOD PRESSURE: 78 MMHG

## 2025-04-08 NOTE — PRE-PROCEDURE INSTRUCTIONS
Information to Prepare you for your Surgery    PRE-ADMIT TESTING -  [phone]    [address] [facility]          Your surgery has been scheduled at [facility]. We are pleased to have the opportunity to serve you. For Further Information please call [phone].    On the day of surgery please report to the Information Desk on the 1st floor.    CONTACT YOUR PHYSICIAN'S OFFICE THE DAY PRIOR TO YOUR SURGERY TO OBTAIN YOUR ARRIVAL TIME.     The evening before surgery do not eat anything after 9 p.m. ( this includes hard candy, chewing gum and mints).  You may only have GATORADE, POWERADE AND WATER  from 9 p.m. until you leave your home.   DO NOT DRINK ANY LIQUIDS ON THE WAY TO THE HOSPITAL.      Why does your anesthesiologist allow you to drink Gatorade/Powerade before surgery?  Gatorade/Powerade helps to increase your comfort before surgery and to decrease your nausea after surgery. The carbohydrates in Gatorade/Powerade help reduce your body's stress response to surgery.  If you are a diabetic-drink only water prior to surgery.    Outpatient Surgery- May allow 2 adult (18 and older) Support Persons (1 being the designated ) for all surgical/procedural patients. A breastfeeding mother will be allowed her infant and 2 adult Support Persons. No one under the age of 18 will be allowed in the building.       SPECIAL MEDICATION INSTRUCTIONS: TAKE medications checked off by the Anesthesiologist on your Medication List.    Angiogram Patients: Take medications as instructed by your physician, including aspirin.     Surgery Patients:    If you take ASPIRIN - Your PHYSICIAN/SURGEON will need to inform you IF/OR when you need to stop taking aspirin prior to your surgery.     The week prior to surgery do not ot take any medications containing IBUPROFEN or NSAIDS ( Advil, Motrin, Goodys, BC, Aleve, Naproxen etc) If you are not sure if you should take a medicine  please call your surgeon's office.  Ok to take Tylenol    Do Not Wear any make-up (especially eye make-up) to surgery. Please remove any false eyelashes or eyelash extensions. If you arrive the day of surgery with makeup/eyelashes on you will be required to remove prior to surgery. (There is a risk of corneal abrasions if eye makeup/eyelash extensions are not removed)      Leave all valuables at home.   Do Not wear any jewelry or watches, including any metal in body piercings. Jewelry must be removed prior to coming to the hospital.  There is a possibility that rings that are unable to be removed may be cut off if they are on the surgical extremity.    Please remove all hair extensions, wigs, clips and any other metal accessories/ ornaments from your hair.  These items may pose a flammable/fire risk in Surgery and must be removed.    Do not shave your surgical area at least 5 days prior to your surgery. The surgical prep will be performed at the hospital according to Infection Control regulations.    Contact Lens must be removed before surgery. Either do not wear the contact lens or bring a case and solution for storage.  Please bring a container for eyeglasses or dentures as required.  Bring any paperwork your physician has provided, such as consent forms,  history and physicals, doctor's orders, etc.   Bring comfortable clothes that are loose fitting to wear upon discharge. Take into consideration the type of surgery being performed.  Maintain your diet as advised per your physician the day prior to surgery.      Adequate rest the night before surgery is advised.   Park in the Parking lot behind the hospital or in the [facility] across the street from the parking lot. Parking is complimentary.  If you will be discharged the same day as your procedure, please arrange for a responsible adult to drive you home or to accompany you if traveling by taxi.   YOU WILL NOT BE PERMITTED TO DRIVE OR TO LEAVE THE  HOSPITAL ALONE AFTER SURGERY.   If you are being discharged the same day, it is strongly recommended that you arrange for someone to remain with you for the first 24 hrs following your surgery.    The Surgeon will speak to your family/visitor after your surgery regarding the outcome of your surgery and post op care.  The Surgeon may speak to you after your surgery, but there is a possibility you may not remember the details.  Please check with your family members regarding the conversation with the Surgeon.    We strongly recommend whoever is bringing you home be present for discharge instructions.  This will ensure a thorough understanding for your post op home care.    If the patient has fever, cough, or signs/symptoms of Flu or Covid please do not come in for your surgery. Contact your surgeon and your primary care physician for further instructions.           Thank you for your cooperation.  The Staff of [facility].            Bathing Instructions with Hibiclens    Shower the evening before and morning of your procedure with Chlorhexidine (Hibiclens)  do not use Chlorhexidine on your face or genitals. Do not get in your eyes.  Wash your face with water and your regular face wash/soap  Use your regular shampoo  Apply Chlorhexidine (Hibiclens) directly on your skin or on a wet washcloth and wash gently. When showering: Move away from the shower stream when applying Chlorhexidine (Hibiclens) to avoid rinsing off too soon.  Rinse thoroughly with warm water  Do not dilute Chlorhexidine (Hibiclens)   Dry off as usual, do not use any deodorant, powder, body lotions, perfume, after shave or cologne.

## 2025-04-14 ENCOUNTER — PATIENT MESSAGE (OUTPATIENT)
Dept: SURGERY | Facility: CLINIC | Age: 65
End: 2025-04-14
Payer: COMMERCIAL

## 2025-04-14 ENCOUNTER — TELEPHONE (OUTPATIENT)
Dept: SURGERY | Facility: CLINIC | Age: 65
End: 2025-04-14
Payer: COMMERCIAL

## 2025-04-14 NOTE — TELEPHONE ENCOUNTER
Left message on patient's voicemail and [facility] for her to arrive at the [facility] tomorrow 4/15/25 at 9am for surgery with Dr. Cardozo.  Pre-Op instructions reinforced.  Direct phone number given for her to call back with any questions or concerns.

## 2025-04-15 ENCOUNTER — ANESTHESIA (OUTPATIENT)
Dept: SURGERY | Facility: OTHER | Age: 65
End: 2025-04-15
Payer: COMMERCIAL

## 2025-04-15 ENCOUNTER — HOSPITAL ENCOUNTER (OUTPATIENT)
Facility: OTHER | Age: 65
Discharge: HOME | End: 2025-04-15
Attending: SURGERY | Admitting: SURGERY
Payer: COMMERCIAL

## 2025-04-15 VITALS
WEIGHT: 230 LBS | TEMPERATURE: 98 F | BODY MASS INDEX: 36.1 KG/M2 | DIASTOLIC BLOOD PRESSURE: 79 MMHG | HEIGHT: 67 IN | HEART RATE: 77 BPM | SYSTOLIC BLOOD PRESSURE: 136 MMHG | OXYGEN SATURATION: 98 % | RESPIRATION RATE: 18 BRPM

## 2025-04-15 DIAGNOSIS — E21.0 PRIMARY HYPERPARATHYROIDISM: Primary | ICD-10-CM

## 2025-04-15 LAB
PTH-INTACT SERPL-MCNC: 142.2 PG/ML
PTH-INTACT SERPL-MCNC: 160.5 PG/ML
PTH-INTACT SERPL-MCNC: 169.9 PG/ML
PTH-INTACT SERPL-MCNC: 218 PG/ML
PTH-INTACT SERPL-MCNC: 247.1 PG/ML
PTH-INTACT SERPL-MCNC: 248.8 PG/ML
PTH-INTACT SERPL-MCNC: 73.7 PG/ML

## 2025-04-15 PROCEDURE — 63600175 PHARM REV CODE 636 W HCPCS: Performed by: SURGERY

## 2025-04-15 PROCEDURE — 63600175 PHARM REV CODE 636 W HCPCS: Performed by: ANESTHESIOLOGY

## 2025-04-15 PROCEDURE — 37000009 HC ANESTHESIA EA ADD 15 MINS: Performed by: SURGERY

## 2025-04-15 PROCEDURE — 25000003 PHARM REV CODE 250: Performed by: NURSE ANESTHETIST, CERTIFIED REGISTERED

## 2025-04-15 PROCEDURE — 71000015 HC POSTOP RECOV 1ST HR: Performed by: SURGERY

## 2025-04-15 PROCEDURE — 63600175 PHARM REV CODE 636 W HCPCS: Performed by: NURSE ANESTHETIST, CERTIFIED REGISTERED

## 2025-04-15 PROCEDURE — 76937 US GUIDE VASCULAR ACCESS: CPT | Mod: 26,,, | Performed by: ANESTHESIOLOGY

## 2025-04-15 PROCEDURE — 25000003 PHARM REV CODE 250: Performed by: ANESTHESIOLOGY

## 2025-04-15 PROCEDURE — 36620 INSERTION CATHETER ARTERY: CPT | Mod: 59,,, | Performed by: ANESTHESIOLOGY

## 2025-04-15 PROCEDURE — 37000008 HC ANESTHESIA 1ST 15 MINUTES: Performed by: SURGERY

## 2025-04-15 PROCEDURE — 60500 EXPLORE PARATHYROID GLANDS: CPT | Mod: ,,, | Performed by: SURGERY

## 2025-04-15 PROCEDURE — 71000039 HC RECOVERY, EACH ADD'L HOUR: Performed by: SURGERY

## 2025-04-15 PROCEDURE — 36000706: Performed by: SURGERY

## 2025-04-15 PROCEDURE — 71000033 HC RECOVERY, INTIAL HOUR: Performed by: SURGERY

## 2025-04-15 PROCEDURE — 88305 TISSUE EXAM BY PATHOLOGIST: CPT | Mod: TC,91 | Performed by: SURGERY

## 2025-04-15 PROCEDURE — 83970 ASSAY OF PARATHORMONE: CPT | Mod: 91 | Performed by: SURGERY

## 2025-04-15 PROCEDURE — 27201423 OPTIME MED/SURG SUP & DEVICES STERILE SUPPLY: Performed by: SURGERY

## 2025-04-15 PROCEDURE — 71000016 HC POSTOP RECOV ADDL HR: Performed by: SURGERY

## 2025-04-15 PROCEDURE — 25000003 PHARM REV CODE 250: Performed by: SURGERY

## 2025-04-15 PROCEDURE — 36000707: Performed by: SURGERY

## 2025-04-15 PROCEDURE — 36620 INSERTION CATHETER ARTERY: CPT | Performed by: ANESTHESIOLOGY

## 2025-04-15 RX ORDER — GLUCAGON 1 MG
1 KIT INJECTION
Status: DISCONTINUED | OUTPATIENT
Start: 2025-04-15 | End: 2025-04-15 | Stop reason: HOSPADM

## 2025-04-15 RX ORDER — CEFAZOLIN 2 G/1
2 INJECTION, POWDER, FOR SOLUTION INTRAMUSCULAR; INTRAVENOUS
Status: DISCONTINUED | OUTPATIENT
Start: 2025-04-15 | End: 2025-04-15 | Stop reason: HOSPADM

## 2025-04-15 RX ORDER — SODIUM CHLORIDE 0.9 % (FLUSH) 0.9 %
3 SYRINGE (ML) INJECTION
Status: DISCONTINUED | OUTPATIENT
Start: 2025-04-15 | End: 2025-04-15 | Stop reason: HOSPADM

## 2025-04-15 RX ORDER — SUCCINYLCHOLINE CHLORIDE 20 MG/ML
INJECTION INTRAMUSCULAR; INTRAVENOUS
Status: DISCONTINUED | OUTPATIENT
Start: 2025-04-15 | End: 2025-04-15

## 2025-04-15 RX ORDER — SODIUM CHLORIDE, SODIUM LACTATE, POTASSIUM CHLORIDE, CALCIUM CHLORIDE 600; 310; 30; 20 MG/100ML; MG/100ML; MG/100ML; MG/100ML
INJECTION, SOLUTION INTRAVENOUS CONTINUOUS
Status: DISCONTINUED | OUTPATIENT
Start: 2025-04-15 | End: 2025-04-15 | Stop reason: HOSPADM

## 2025-04-15 RX ORDER — PROPOFOL 10 MG/ML
VIAL (ML) INTRAVENOUS CONTINUOUS PRN
Status: DISCONTINUED | OUTPATIENT
Start: 2025-04-15 | End: 2025-04-15

## 2025-04-15 RX ORDER — IBUPROFEN 800 MG/1
800 TABLET ORAL 4 TIMES DAILY
COMMUNITY
Start: 2025-04-15 | End: 2025-04-18

## 2025-04-15 RX ORDER — ONDANSETRON HYDROCHLORIDE 2 MG/ML
INJECTION, SOLUTION INTRAVENOUS
Status: DISCONTINUED | OUTPATIENT
Start: 2025-04-15 | End: 2025-04-15

## 2025-04-15 RX ORDER — CALCIUM CARBONATE 400(1000)
TABLET,CHEWABLE ORAL
COMMUNITY
Start: 2025-04-15

## 2025-04-15 RX ORDER — LIDOCAINE HYDROCHLORIDE 20 MG/ML
INJECTION INTRAVENOUS
Status: DISCONTINUED | OUTPATIENT
Start: 2025-04-15 | End: 2025-04-15

## 2025-04-15 RX ORDER — DOCUSATE SODIUM 100 MG/1
100 CAPSULE, LIQUID FILLED ORAL 2 TIMES DAILY
COMMUNITY
Start: 2025-04-15

## 2025-04-15 RX ORDER — ONDANSETRON HYDROCHLORIDE 2 MG/ML
4 INJECTION, SOLUTION INTRAVENOUS ONCE
Status: COMPLETED | OUTPATIENT
Start: 2025-04-15 | End: 2025-04-15

## 2025-04-15 RX ORDER — DEXAMETHASONE SODIUM PHOSPHATE 4 MG/ML
INJECTION, SOLUTION INTRA-ARTICULAR; INTRALESIONAL; INTRAMUSCULAR; INTRAVENOUS; SOFT TISSUE
Status: DISCONTINUED | OUTPATIENT
Start: 2025-04-15 | End: 2025-04-15

## 2025-04-15 RX ORDER — BUPIVACAINE HYDROCHLORIDE 5 MG/ML
INJECTION, SOLUTION EPIDURAL; INTRACAUDAL; PERINEURAL
Status: DISCONTINUED | OUTPATIENT
Start: 2025-04-15 | End: 2025-04-15 | Stop reason: HOSPADM

## 2025-04-15 RX ORDER — SODIUM CHLORIDE 9 MG/ML
INJECTION, SOLUTION INTRAVENOUS CONTINUOUS
Status: DISCONTINUED | OUTPATIENT
Start: 2025-04-15 | End: 2025-04-15 | Stop reason: HOSPADM

## 2025-04-15 RX ORDER — ACETAMINOPHEN 325 MG/1
650 TABLET ORAL EVERY 6 HOURS PRN
COMMUNITY
Start: 2025-04-15

## 2025-04-15 RX ORDER — FENTANYL CITRATE 50 UG/ML
INJECTION, SOLUTION INTRAMUSCULAR; INTRAVENOUS
Status: DISCONTINUED | OUTPATIENT
Start: 2025-04-15 | End: 2025-04-15

## 2025-04-15 RX ORDER — PROCHLORPERAZINE EDISYLATE 5 MG/ML
5 INJECTION INTRAMUSCULAR; INTRAVENOUS EVERY 30 MIN PRN
Status: DISCONTINUED | OUTPATIENT
Start: 2025-04-15 | End: 2025-04-15 | Stop reason: HOSPADM

## 2025-04-15 RX ORDER — PROPOFOL 10 MG/ML
VIAL (ML) INTRAVENOUS
Status: DISCONTINUED | OUTPATIENT
Start: 2025-04-15 | End: 2025-04-15

## 2025-04-15 RX ORDER — OXYCODONE HYDROCHLORIDE 5 MG/1
5 TABLET ORAL
Status: DISCONTINUED | OUTPATIENT
Start: 2025-04-15 | End: 2025-04-15 | Stop reason: HOSPADM

## 2025-04-15 RX ORDER — HYDROMORPHONE HYDROCHLORIDE 2 MG/ML
0.4 INJECTION, SOLUTION INTRAMUSCULAR; INTRAVENOUS; SUBCUTANEOUS EVERY 5 MIN PRN
Status: DISCONTINUED | OUTPATIENT
Start: 2025-04-15 | End: 2025-04-15 | Stop reason: HOSPADM

## 2025-04-15 RX ORDER — LIDOCAINE HYDROCHLORIDE 10 MG/ML
0.5 INJECTION, SOLUTION EPIDURAL; INFILTRATION; INTRACAUDAL; PERINEURAL ONCE
Status: DISCONTINUED | OUTPATIENT
Start: 2025-04-15 | End: 2025-04-15 | Stop reason: HOSPADM

## 2025-04-15 RX ORDER — PHENYLEPHRINE HYDROCHLORIDE 10 MG/ML
INJECTION INTRAVENOUS
Status: DISCONTINUED | OUTPATIENT
Start: 2025-04-15 | End: 2025-04-15

## 2025-04-15 RX ORDER — EPHEDRINE SULFATE 50 MG/ML
INJECTION, SOLUTION INTRAVENOUS
Status: DISCONTINUED | OUTPATIENT
Start: 2025-04-15 | End: 2025-04-15

## 2025-04-15 RX ORDER — MIDAZOLAM HYDROCHLORIDE 1 MG/ML
INJECTION INTRAMUSCULAR; INTRAVENOUS
Status: DISCONTINUED | OUTPATIENT
Start: 2025-04-15 | End: 2025-04-15

## 2025-04-15 RX ADMIN — OXYCODONE HYDROCHLORIDE 5 MG: 5 TABLET ORAL at 03:04

## 2025-04-15 RX ADMIN — MIDAZOLAM HYDROCHLORIDE 2 MG: 1 INJECTION, SOLUTION INTRAMUSCULAR; INTRAVENOUS at 11:04

## 2025-04-15 RX ADMIN — SODIUM CHLORIDE, SODIUM LACTATE, POTASSIUM CHLORIDE, AND CALCIUM CHLORIDE: 600; 310; 30; 20 INJECTION, SOLUTION INTRAVENOUS at 11:04

## 2025-04-15 RX ADMIN — REMIFENTANIL HYDROCHLORIDE 0.05 MCG/KG/MIN: 1 INJECTION, POWDER, LYOPHILIZED, FOR SOLUTION INTRAVENOUS at 12:04

## 2025-04-15 RX ADMIN — EPHEDRINE SULFATE 10 MG: 50 INJECTION INTRAVENOUS at 12:04

## 2025-04-15 RX ADMIN — PHENYLEPHRINE HYDROCHLORIDE 100 MCG: 10 INJECTION INTRAVENOUS at 12:04

## 2025-04-15 RX ADMIN — EPHEDRINE SULFATE 5 MG: 50 INJECTION INTRAVENOUS at 01:04

## 2025-04-15 RX ADMIN — SUCCINYLCHOLINE CHLORIDE 180 MG: 20 INJECTION, SOLUTION INTRAMUSCULAR; INTRAVENOUS at 11:04

## 2025-04-15 RX ADMIN — PROPOFOL 180 MG: 10 INJECTION, EMULSION INTRAVENOUS at 11:04

## 2025-04-15 RX ADMIN — PROCHLORPERAZINE EDISYLATE 5 MG: 5 INJECTION INTRAMUSCULAR; INTRAVENOUS at 05:04

## 2025-04-15 RX ADMIN — EPHEDRINE SULFATE 10 MG: 50 INJECTION INTRAVENOUS at 01:04

## 2025-04-15 RX ADMIN — FENTANYL CITRATE 100 MCG: 50 INJECTION, SOLUTION INTRAMUSCULAR; INTRAVENOUS at 11:04

## 2025-04-15 RX ADMIN — PROPOFOL 50 MG: 10 INJECTION, EMULSION INTRAVENOUS at 02:04

## 2025-04-15 RX ADMIN — PHENYLEPHRINE HYDROCHLORIDE 100 MCG: 10 INJECTION INTRAVENOUS at 01:04

## 2025-04-15 RX ADMIN — PROPOFOL 100 MCG/KG/MIN: 10 INJECTION, EMULSION INTRAVENOUS at 02:04

## 2025-04-15 RX ADMIN — DEXAMETHASONE SODIUM PHOSPHATE 8 MG: 4 INJECTION, SOLUTION INTRAMUSCULAR; INTRAVENOUS at 12:04

## 2025-04-15 RX ADMIN — ONDANSETRON HYDROCHLORIDE 4 MG: 2 INJECTION INTRAMUSCULAR; INTRAVENOUS at 02:04

## 2025-04-15 RX ADMIN — LIDOCAINE HYDROCHLORIDE 50 MG: 20 INJECTION, SOLUTION INTRAVENOUS at 11:04

## 2025-04-15 RX ADMIN — GLYCOPYRROLATE 0.2 MG: 0.2 INJECTION, SOLUTION INTRAMUSCULAR; INTRAVITREAL at 12:04

## 2025-04-15 RX ADMIN — EPHEDRINE SULFATE 5 MG: 50 INJECTION INTRAVENOUS at 12:04

## 2025-04-15 RX ADMIN — SODIUM CHLORIDE: 0.9 INJECTION, SOLUTION INTRAVENOUS at 01:04

## 2025-04-15 RX ADMIN — ONDANSETRON 4 MG: 2 INJECTION INTRAMUSCULAR; INTRAVENOUS at 03:04

## 2025-04-15 NOTE — DISCHARGE INSTRUCTIONS
Post-Operative Instructions: Parathyroidectomy    NEW MEDICATIONS    Calcium Supplementation  Calcium Carbonate (Tums Ultra): 2 tablet, 2 times a day with a snack or meal  One Tums Ultra tablet has 1000 mg calcium carbonate which is equal to 400 mg elemental calcium.  *If you notice any numbness or tingling of the face, hands, or feet, take 2 extra tablets of Tums.  If symptoms do not subside within a half-hour, please call your surgeon's office.  Wait at least 4 hours after taking levothyroxine before taking the Tums.  Do NOT take your Tums in the morning of your lab draw.   Calcium carbonate (Tums) can cause constipation.  Please use over the counter stool softeners such as docusate (Colace) or laxatives such as polyethylene glycol (Miralax) to help avoid constipation.     Vitamin D    Please take 2000 IU daily of vitamin D3 (cholecalciferol) daily, which can be purchased over the counter from your local pharmacy or online.     PAIN CONTROL  Most discomfort after parathyroid surgery is from inflammation and swelling and is relieved with anti-inflammatory medication, throat lozenges or spray, and an ice pack.  Starting after surgery, take 650 mg of acetaminophen (Tylenol) every 6-8 hours.  Do not exceed 4000 mg per day.  Acetaminophen helps relieve inflammation.  Starting the day after your surgery, take 800 mg ibuprofen (Advil or Motrin) every 6-8 hours. Do not exceed 3200 mg per day.  Alternate between 650 mg acetaminophen and 800 mg ibuprofen every 3 hours for at least the first three days after surgery for pain control.  For example, take acetaminophen at 7 AM, then 3 hours later at 10 AM take ibuprofen, then 3 hours later at 1 PM take acetaminophen and 3 hours later at 4 PM take ibuprofen, etc...  Please use your ice pack for 30 minutes on / 30 minutes off for at least the first 3 days.  The ice pack helps reduce swelling.  Most patients do not need narcotic medication.  Narcotics often cause stomach upset and  constipation and should be used sparingly.    Cepacol throat lozenges or spray can be used as needed for sore throat.  These can be purchased over the counter at the pharmacy.    HOME MEDICATIONS  You may resume taking your normal medications, with the EXCEPTION of the following:  Wait 3 days after your surgery before taking the following medications unless otherwise instructed by your surgeon or internist/cardiologist: Aspirin or aspirin containing medications (i.e. Goody's, Excedrin), Apixaban (Eliquis), Clopidogrel (Plavix), Dabigatran (Pradaxa), Dipyridamole (Aggrenox), Rivaroxaban (Xarelto), Warfarin (Coumadin), or any other type of blood thinner you may be taking.  Please wait 1 week after surgery to restart herbal medications, vitamins or minerals    INCISION CARE  Please use your ice pack for 30 minutes on / 30 minutes off for at least the first 3 days.  The ice pack helps reduce swelling.  You will be discharged from the hospital with your incision covered by skin glue that will remain on until your postoperative appointment.  It is normal to develop a lump under the incision, this is expected and is related to the healing process.  The lump will gradually go away with time.  Ok to shower when you return home after surgery  Please notify your physician if your incision is red, warm/hot, if you have an increase in swelling, any new drainage, or if you have a fever >101.5 F.  Please call 911 or proceed to the Emergency Room if you have difficulty breathing.    ACTIVITIES  You may resume normal activities, depending on your energy level.    Please refrain from driving for at least 3 days, or until you have complete mobility of your neck.    Do not drive if you are taking narcotic pain medication.    Please refrain from heavy lifting (10 lbs.) for 10 days.    RETURN TO WORK  Recovery after surgery depends on the individual.    Most patients can expect to return to work approximately 1-2 weeks after surgery.      DIET  You have no dietary or food restrictions.  You can eat the foods you normally eat.    Softer foods may be more comfortable to swallow for the first few days after surgery.    QUESTIONS OR CONCERNS  If you have any questions or concerns during the daytime, please send a [employer] message to your surgeon or call the surgeon's office at [phone].    On nights and weekends, please call [phone] and ask for the General Surgery Resident on call.    For emergencies, difficulty breathing or shortness of breath, please call [phone], or report to the closest Emergency Department    Please notify your provider if you experience any of the following:  Bleeding, redness, warm to the touch, swelling, drainage from surgical incisions, bad smell from incision  Your pain level increases and does not improve with the pain medicines you have been given  Temperature greater than 101.5 F (38.1 C) degrees, chills  Inability to eat, drink fluids, or take medications  Nausea or vomiting  Dizziness or passing out  Develop a rash  Have pain or trouble urinating, or you pass blood in your urine  Develop a new cough  You are constipated or have diarrhea    UPCOMING APPOINTMENTS  Future Appointments   Date Time Provider Department Center   4/28/2025 11:00 AM LAB, APPOINTMENT [facility] LAB [facility]   4/28/2025 11:30 AM Yolande Cardozo MD [facility]   9/29/2025  9:40 AM Dawn Felix, FNP-C [facility]      Do NOT take your calcium supplements in the morning of your lab appointment.

## 2025-04-15 NOTE — H&P
Consult Note  Endocrine Surgery    Visit Diagnosis: Primary hyperparathyroidism [E21.0]    SUBJECTIVE:     Patient is a 65 y.o. female who was referred by Dr. David Queen and is here unaccompanied and presents for evaluation of primary hyperparathyroidism. The patient has had complaints of elevation of the serum calcium and parathormone(with calcium intermittently elevated since 2018 and more recently elevated up to 11.1 in 2023). There is history thiazide medication use(stopped thiazide though hypercalcemia persisted). No prior lithium use.  She has not had previous history of head or neck radiation. She admits mood changes and increased thirst. She denies joint pain, abdominal pain, constipation, weakness, lethargy, and increased urination. Furthermore, there is no history of pathologic fractures, malignancy, or personal history of thyroid, adrenal, or pancreatic abnormalities. The patient is vitamin D replete(36 in 10/2024).  She does have familial history of endocrinopathies(Daughter with hyperthyroidism? at 5 years of age tx with SIERRA and sister had possible prolactinoma).  Sister with pituitary problem.      Review of patient's allergies indicates:  No Known Allergies    [Current Medications]    [Current Medications]  Current Outpatient Medications   Medication Sig Dispense Refill    allopurinoL (ZYLOPRIM) 100 MG tablet TAKE 1 TABLET(100 MG) BY MOUTH EVERY DAY 90 tablet 1    amLODIPine (NORVASC) 5 MG tablet TAKE 1 TABLET(5 MG) BY MOUTH DAILY 90 tablet 3    furosemide (LASIX) 20 MG tablet Take 1/2 pill by mouth daily 30 tablet 5    losartan (COZAAR) 50 MG tablet TAKE 1 TABLET(50 MG) BY MOUTH DAILY 90 tablet 3     No current facility-administered medications for this visit.       Past Medical History:   Diagnosis Date    Arthritis of right foot     Elevated uric acid in blood     History of gastroesophageal reflux (GERD)     History of positive PPD     Not treated; Family history of TB    HTN (hypertension)  "1998    Diagnosed following pregnancy; Follows with cardiology    Postmenopausal     Vitamin D deficiency      Past Surgical History:   Procedure Laterality Date    COLONOSCOPY N/A 10/12/2023    Procedure: COLONOSCOPY;  Surgeon: Cecy Nuñez MD;  Location: [facility];  Service: Colon and Rectal;  Laterality: N/A;    None       [Social History]     [Social History]  Tobacco Use    Smoking status: Never     Passive exposure: Never    Smokeless tobacco: Never   Substance Use Topics    Alcohol use: Yes     Alcohol/week: 1.0 standard drink of alcohol     Types: 1 Glasses of wine per week     Comment: rare    Drug use: No         Review of Systems:    Review of Systems   All other systems reviewed and are negative.        OBJECTIVE:     Vital Signs:  /81   Ht 5' 7" (1.702 m)   Wt 108.7 kg (239 lb 12 oz)   BMI 37.55 kg/m²    Body mass index is 37.55 kg/m².      Physical Exam    General:  no distress, see vitals for BMI    Eyes:  conjunctivae/corneas clear   Neck: trachea midline and symmetric, no adenopathy    Thyroid:  thyroid not enlarged   Lung: clear to auscultation bilaterally   Heart:  regular rate and rhythm   Abdomen: soft, non-tender; bowel sounds normal; no masses,  no organomegaly   Skin/Extremities: warm and well-perfused   Pulses: 2+ and symmetric   Neuro: normal without focal findings and mental status, speech normal, alert and oriented x3       Imaging    Studies:  Date:       Results:     DEXA:     7/8/24:   Spine T Score: +2.7,  Femoral neck T Score: +1.4   Ultrasound:  1/24/25 FINDINGS:  The thyroid gland is normal in size.  The right lobe measures 4.9 x 1.6 x 1.4 cm and the left lobe measures 3.7 x 0.9 x 1.4 cm.  The total thyroid volume is 8.1 cc.     The thyroid parenchyma has a homogeneous echotexture.  There is a 14 mm isoechoic solid lesion adjacent to the lower pole of the right lobe, possibly exophytic thyroid nodule versus parathyroid lesion.  No focal lesions in the left lobe.  No " cervical lymphadenopathy on either side.     Impression:     Exophytic thyroid nodule versus parathyroid lesion adjacent to lower pole right thyroid lobe.  No other findings.      Sestamebi/Parathyroid scan:  2/19/24 FINDINGS:  There is physiological tracer uptake in the myocardium, salivary glands, and thyroid gland. Delayed images demonstrate near-complete tracer washout from the thyroid.     No focal abnormal persistent uptake is present on delayed images in the region of the parathyroid glands to suggest parathyroid adenoma.     Impression:     No abnormal uptake to suggest parathyroid adenoma.   Parathyroid protocol CT scan:    pending       Lab Review    24-Hour Urine Collection:  Date: 7/29/24    Urine Calcium:    36.4 mg/dL           Component Value Date    Vit D, 25-Hydroxy 36 10/07/2024    PTH, Intact 193.1 (H) 10/07/2024    Calcium 10.8 (H) 10/07/2024    Phosphorus 2.9 12/29/2023    TSH 1.383 10/07/2024           ASSESSMENT/PLAN:       Assessment    Randi Khan is an 65 y.o. female who presents withprimary hyperparathyroidism. The above testing and examination support the diagnosis. Patient meets criteria for recommending parathyroid surgery. This is based on her history of increased urine calcium. She currently has possible localization to the right] via imaging (Ultrasound).       Plan    Imaging: will obtain a Parathyroid protocol CT Scan.  Medications: patient should continue current medications: OTC Vitamin D  The natural history and treatment options for primary hyperparathyroidism were discussed with the patient. Parathyroidectomy is the only curative treatment for primary hyperparathyroidism. Parathyroidectomy, both minimally invasive technique and standard four-gland exploration, and its risks were discussed. I was also able to duscuss the expected papo-operative course and the risks of surgery including failure to localize the parathyroid gland, persistent or recurrent hyperparathyroidism  "with the possibility of the need for a second surgery, temporary or permanent hypoparathyroidism resulting in low blood calcium levels that require extensive medication to avoid serious degenerative conditions such as cataracts, brittle bones, muscle weakness and muscle irritability. Other risks include bleeding, infection, injury to the recurrent laryngeal nerves resulting in hoarseness or impairment of speech and the risks of general anesthetic including MI, CVA, sudden death or even reaction to anesthetic medications.The role of intraoperative PTH monitoring was also discussed. The patient understands the risks, any and all questions were answered to the patients satisfaction. The patient is amenable to surgery. Written consent was obtained and parathyroid surgery is scheduled for the near future.  Will ensure that patient is medically optimized prior to procedure. In addition, the patient was given our "Understanding Parathyroid Disease" handout and directed to the [employer] Patient Education portal as a resource.     I spent a total of 52 minutes on the day of the visit.This includes face to face time and non-face to face time preparing to see the patient (eg, review of tests), obtaining and/or reviewing separately obtained history, documenting clinical information in the electronic or other health record, independently interpreting results and communicating results to the patient/family/caregiver, or care coordinator.    "

## 2025-04-15 NOTE — TRANSFER OF CARE
"Anesthesia Transfer of Care Note    Patient: Randi Khan    Procedure(s) Performed: Procedure(s) (LRB):  PARATHYROIDECTOMY intraop PTH monitoring (N/A)    Patient location: PACU    Anesthesia Type: general    Transport from OR: Transported from OR on 2-3 L/min O2 by NC with adequate spontaneous ventilation    Post pain: adequate analgesia    Post assessment: no apparent anesthetic complications    Post vital signs: stable    Level of consciousness: awake and alert    Nausea/Vomiting: no nausea/vomiting    Complications: none    Transfer of care protocol was followed      Last vitals: Visit Vitals  BP (!) 166/86 (BP Location: Left arm, Patient Position: Lying)   Pulse 80   Temp 36.6 °C (97.8 °F) (Oral)   Resp 18   Ht 5' 7" (1.702 m)   Wt 104.3 kg (230 lb)   SpO2 100%   Breastfeeding No   BMI 36.02 kg/m²     "

## 2025-04-15 NOTE — DISCHARGE SUMMARY
[facility]  Discharge Summary  Endocrine Surgery    Admit Date: 4/15/2025    Discharge Date and Time: 04/15/2025 3:19 PM    Attending Physician: Yolande Cardozo MD     Discharge Provider: Yolande Cardozo    Reason for Admission: s/p parathyroidectomy    Procedures Performed: Procedure(s) (LRB):  PARATHYROIDECTOMY intraop PTH monitoring (N/A)    Hospital Course (synopsis of major diagnoses, care, treatment, and services provided during the course of the hospital stay): The patient underwent a PARATHYROIDECTOMY intraop PTH monitoring (N/A) on 4/15/2025. The patient was not started on Calcitriol supplementation. The patient did not have a drain placed at surgery.. At the time of discharge, the patient's pain was well-controlled.  She had voided, was ambulatory, and tolerating a diet.    Consults: none    Significant Diagnostic Studies: N/A    Final Diagnoses:   Principal Problem: Primary hyperparathyroidism   Secondary Diagnoses:   Active Hospital Problems    Diagnosis  POA    *Primary hyperparathyroidism [E21.0]  Yes      Resolved Hospital Problems   No resolved problems to display.       Discharged Condition: good    Disposition: Home or Self Care    Follow Up/Patient Instructions:   The parathyroidectomy discharge instruction sheet was provided. The patient was instructed to call the surgeon's office with any additional questions or concerns.    Medications:  Reconciled Home Medications:      Medication List        START taking these medications      acetaminophen 325 MG tablet  Commonly known as: TYLENOL  Take 2 tablets (650 mg total) by mouth every 6 (six) hours as needed for Pain. Take at 0600, 1200, 1800, 0000(Midnight). For 3 days     calcium carbonate 400 mg calcium (1,000 mg) Chew  Take 1000mg calcium twice daily(Tums Ultra).     docusate sodium 100 MG capsule  Commonly known as: COLACE  Take 1 capsule (100 mg total) by mouth 2 (two) times daily. Take in conjunction with Calcium  and narcotic pain medication.    May substitute any OTC stool softener.     ibuprofen 800 MG tablet  Commonly known as: ADVIL,MOTRIN  Take 1 tablet (800 mg total) by mouth 4 (four) times daily. Take 800mg at 1100, 1500, 2300, 0300 for 2 days. for 3 days            CONTINUE taking these medications      allopurinoL 100 MG tablet  Commonly known as: ZYLOPRIM  Take 1 tablet (100 mg total) by mouth once daily.     amLODIPine 5 MG tablet  Commonly known as: NORVASC  Take 1 tablet (5 mg total) by mouth once daily.     CARDIOTEK (BIOPERINE) ORAL  Take by mouth once daily.     COLLAGEN MISC  by Misc.(Non-Drug; Combo Route) route Daily.     fluticasone propionate 50 mcg/actuation nasal spray  Commonly known as: FLONASE  1 spray (50 mcg total) by Each Nostril route once daily.     furosemide 20 MG tablet  Commonly known as: LASIX  Take 1/2 pill by mouth daily     losartan 50 MG tablet  Commonly known as: COZAAR  Take 1 tablet (50 mg total) by mouth once daily.     montelukast 10 mg tablet  Commonly known as: SINGULAIR  Take 1 tablet (10 mg total) by mouth every evening.     promethazine-dextromethorphan 6.25-15 mg/5 mL Syrp  Commonly known as: PROMETHAZINE-DM  Take 5 mLs by mouth every 4 to 6 hours as needed (cough and congestion).     TURMERIC ORAL  Take by mouth Daily.     UNABLE TO FIND  Daily. Mushroom supplement            Discharge Procedure Orders   Diet general     Ice to affected area   Order Comments: 30 minutes on and then 30 minutes off. Repeat     Other restrictions (specify):   Order Comments: May shower immediately.  NO baths or soaks.  No driving while using narcotic pain medication.     No dressing needed   Order Comments: May shower on the day following discharge.  No baths or soaks.  OK to wash hair.     Call MD for:  temperature >100.4     Call MD for:  persistent nausea and vomiting     Call MD for:  severe uncontrolled pain     Call MD for:  difficulty breathing, headache or visual disturbances     Call  MD for:  redness, tenderness, or signs of infection (pain, swelling, redness, odor or green/yellow discharge around incision site)     Call MD for:  hives     Call MD for:  persistent dizziness or light-headedness     Call MD for:  extreme fatigue     Call MD for:   Order Comments: If you have numbness and/or tingling around the face, lips, fingertips, toes take four(4) 500mg tablets of calcium carbonate (Tums).  The symptoms should go away in 15-30 minutes.   If the symptoms persist at 30 minutes you can repeat this.  If they still don't go away, call the physician.      Follow-up Information       Yolande Cardozo MD Follow up in 2 week(s).    Specialty: General Surgery  Contact information:  [facility] [facility] [address] [phone]

## 2025-04-15 NOTE — ANESTHESIA POSTPROCEDURE EVALUATION
Anesthesia Post Evaluation    Patient: Randi Khan    Procedure(s) Performed: Procedure(s) (LRB):  PARATHYROIDECTOMY intraop PTH monitoring (N/A)    Final Anesthesia Type: general      Patient location during evaluation: PACU  Patient participation: Yes- Able to Participate  Level of consciousness: awake and alert  Post-procedure vital signs: reviewed and stable  Pain management: adequate  Airway patency: patent    PONV status at discharge: No PONV  Anesthetic complications: no      Cardiovascular status: blood pressure returned to baseline  Respiratory status: spontaneous ventilation  Hydration status: euvolemic  Follow-up not needed.          Vitals Value Taken Time   /78 04/15/25 16:31   Temp 36.3 °C (97.4 °F) 04/15/25 15:30   Pulse 81 04/15/25 16:31   Resp 14 04/15/25 16:15   SpO2 94 % 04/15/25 16:31   Vitals shown include unfiled device data.      No case tracking events are documented in the log.      Pain/Adam Score: Pain Rating Prior to Med Admin: 5 (4/15/2025  3:21 PM)  Adam Score: 9 (4/15/2025  4:15 PM)

## 2025-04-15 NOTE — ANESTHESIA PROCEDURE NOTES
Arterial    Diagnosis: hyperparathyroid    Patient location during procedure: pre-op  Procedure end time: 4/15/2025 11:02 AM    Staffing  Authorizing Provider: Elías See MD  Performing Provider: Elías See MD    Staffing  Performed by: Elías See MD  Authorized by: Elías See MD    Anesthesiologist was present at the time of the procedure.    Preanesthetic Checklist  Completed: patient identified, IV checked, site marked, risks and benefits discussed, surgical consent, monitors and equipment checked, pre-op evaluation, timeout performed and anesthesia consent givenArterial  Skin Prep: alcohol swabs  Local Infiltration: lidocaine  Orientation: left  Location: radial    Catheter Size: 20 G  Catheter placement by Ultrasound guidance. Heme positive aspiration all ports.   Vessel Caliber: medium, patent, compressibility normal  Vascular Doppler:  not done  Needle advanced into vessel with real time Ultrasound guidance.  Guidewire confirmed in vessel.  Sterile sheath used.  Image recorded and saved.Insertion Attempts: 1  Assessment  Dressing: secured with tape and tegaderm  Patient: Tolerated well

## 2025-04-15 NOTE — ANESTHESIA PROCEDURE NOTES
Intubation    Date/Time: 4/15/2025 11:59 AM    Performed by: Estefany No CRNA  Authorized by: Bertram Clement MD    Intubation:     Induction:  Intravenous    Intubated:  Postinduction    Mask Ventilation:  Not attempted    Attempts:  1    Attempted By:  CRNA    Method of Intubation:  Video laryngoscopy    Blade:  Dunne 3    Laryngeal View Grade: Grade I - full view of cords      Difficult Airway Encountered?: No      Complications:  None    Airway Device:  EMG ETT (NIMS)    Airway Device Size:  7.0    Style/Cuff Inflation:  Cuffed (inflated to minimal occlusive pressure)    Inflation Amount (mL):  8    Tube secured:  20    Secured at:  The lips    Placement Verified By:  Capnometry and Chest x-ray    Complicating Factors:  None    Findings Post-Intubation:  BS equal bilateral

## 2025-04-15 NOTE — OP NOTE
Operative Report  Endocrine Surgery    Date: 4/15/2025    Indications: This patient presents with biochemical evidence of primary hyperparathyroidism. Preoperative  imaging(US) did reveal increased possible abnormal parathyroid at the right inferior position. Patient also underwent Parathyroid protocol CT which demonstrated possible bilateral inferior parathyroid tissue. An arterial line was placed.     Pre-Operative Diagnosis: primary hyperparathyroidism    Post-Operative Diagnosis: primary hyperparathyroidism    Procedure: Parathyroidectomy with IOPTH monitoring    Surgeon: Yolande Cardozo M.D.    Assistants: None    Anesthesia: General     ASA Class: 3    Findings:  1) Enlarged right inferior parathyroid gland found in posteriorly and removed. 720 mg and measurements are 15 mm x 11 mm x 4 mm.   2) Left inferior parathyroid remains in situ.    Right superior and left superior parathyroid glands removed (in addition to the right inferior gland)  2) IOPTH levels as follows:   Baseline - 247.1   Time zero - 218   5 minute post - 169.9   10 minute post - 160.5   15 minute post - 248.8    10 minute post second gland(right superior gland) excision - 142.2                 10 minute post subtotal(left superior gland) excision - 73.7      Estimated Blood Loss (EBL):  30mL    Drains: None    Total IV Fluids: see anesthesia record     Specimens:  1) ? right inferior parathyroid gland - FS confirmed the presence of parathyroid tissue  2)  ? right superior parathyroid gland - FS confirmed the presence of parathyroid tissue  3) ? Left superior parathyroid gland - FS confirmed the presence of parathyroid tissue  4)  ? tissue nferior parathyroid gland - FS confirmed the presence of parathyroid tissue  5) remaining right superior parathyroid tissue-permanent  6) remaining left superior parathyroid tissue-permanent    Procedure in Detail:    The patient was seen in the Holding Room. The risks, benefits, complications,  treatment options, and expected outcomes were discussed with the patient. The possibilities of reaction to medication, pulmonary aspiration, bleeding, recurrent infection, possibility of normal findings, recurrent laryngeal nerve damage, the need for additional procedures, failure to diagnose a condition, and creating a complication requiring transfusion or operation were discussed with the patient. The patient concurred with the proposed plan, giving informed consent.  The site of surgery properly noted/marked. The patient was taken to Operating Room, identified as Randi Khan and the procedure verified as Parathyroidectomy. A Time Out was held and the above information confirmed.    The patient was brought to the operating room and placed supine.  After induction of a general anesthetic, the neck was placed in extension and a pressure bag shoulder roll was placed between the scapulae. The neck was prepped and draped in standard fashion.  A 3.5 cm transverse cervical incision was created within a natural skin fold.  Dissection was carried through the platysma and flaps were raised both superior and inferiorly. The strap muscles were identified and divided in the midline.  Retractors were placed to expose the right side of the neck.  Using sharp dissection the thyroid lobe was mobilized in a medial direction, exposing the tracheoesophageal groove.  None of the blood supply to the thyroid gland was divided during this dissection.  Further dissection posterior revealed a inferior parathyroid gland along the right lower pole.     This was mobilized with sharp dissection.  The vascular pedicle was clipped and the specimen was submitted to pathology, revealing mild hypercellularity.  Parathyroid assay monitoring was performed intraoperatively   and did not demonstrate a significant decrease compared to baseline levels. The exploration continued with exposure of the right  superior gland seen in the right lower  pole, in a posterior location.  This was biopsied.  Frozen section revealed similar histologic findings.  Attention was then directed to the contralateral neck.  The thyroid lobe was mobilized in a similar fashion.  The left superior and inferior  glands were identified in the anterior (at the left lower pole) and posteriorly (at the left superior pole) areas respectfully.  These which all appeared enlarged for biopsied and submitted to pathology and confirmed the presence of parathyroid tissue.  Based on the clinical findings that all four parathyroid glands were enlarged decision was made to excise the right superior gland.  This demonstrated a decrease in PTH levels though not the expected drop to within the normal range.  At this point decision was made to remove the left superior gland as this was the larger of the two remaining.  Both recurrent laryngeal nerves were identified and preserved during the dissection (at the level of the vagus).    Hemostasis was achieved in the neck with bipolar cautery. Fibrillar was placed into the field of dissection to aid this process.  Marcaine was placed into the strap muscles and subcutaneous tissues for post-op analgesia. Closure was performed by reapproximating the strap muscles in the midline with an interrupted 3-0 Vicryl suture.  The platysma was reapproximated with 3-0 Vicryl suture and the skin incision was closed with a 5-0 Monocryl knot-less, subcuticular closure.  Sterile dressings were placed.        Instrument, sponge, and needle counts were correct prior to closure and at the conclusion of the case.     Implants: None    Complications: No immediate noted    Condition: stable    Disposition: PACU - hemodynamically stable.    Attending Attestation: I performed the procedure.

## 2025-04-16 NOTE — PLAN OF CARE
Randi Vijay Khan has met all discharge criteria from Phase II. Vital Signs are stable, ambulating  without difficulty. Discharge instructions given, patient verbalized understanding. Discharged from facility via wheelchair in stable condition.

## 2025-04-17 ENCOUNTER — RESULTS FOLLOW-UP (OUTPATIENT)
Dept: SURGERY | Facility: OTHER | Age: 65
End: 2025-04-17

## 2025-04-17 LAB
ESTROGEN SERPL-MCNC: NORMAL PG/ML
INSULIN SERPL-ACNC: NORMAL U[IU]/ML
LAB AP CLINICAL INFORMATION: NORMAL
LAB AP GROSS DESCRIPTION: NORMAL
LAB AP INTRA OP: NORMAL
LAB AP PERFORMING LOCATION(S): NORMAL
LAB AP REPORT FOOTNOTES: NORMAL

## 2025-04-21 ENCOUNTER — TELEPHONE (OUTPATIENT)
Dept: SURGERY | Facility: CLINIC | Age: 65
End: 2025-04-21
Payer: COMMERCIAL

## 2025-04-21 NOTE — TELEPHONE ENCOUNTER
Left message on patient's voicemail as a follow-up to her Parathyroidectomy with Dr. Cardozo on 4/15/25.  Direct phone number given for her to call with any questions or concerns.

## 2025-04-28 ENCOUNTER — OFFICE VISIT (OUTPATIENT)
Dept: SURGERY | Facility: CLINIC | Age: 65
End: 2025-04-28
Payer: COMMERCIAL

## 2025-04-28 VITALS
SYSTOLIC BLOOD PRESSURE: 120 MMHG | BODY MASS INDEX: 37.38 KG/M2 | WEIGHT: 238.19 LBS | HEIGHT: 67 IN | DIASTOLIC BLOOD PRESSURE: 82 MMHG

## 2025-04-28 DIAGNOSIS — E21.0 PRIMARY HYPERPARATHYROIDISM: Primary | ICD-10-CM

## 2025-04-28 PROCEDURE — 1159F MED LIST DOCD IN RCRD: CPT | Mod: CPTII,S$GLB,, | Performed by: SURGERY

## 2025-04-28 PROCEDURE — 4010F ACE/ARB THERAPY RXD/TAKEN: CPT | Mod: CPTII,S$GLB,, | Performed by: SURGERY

## 2025-04-28 PROCEDURE — 99024 POSTOP FOLLOW-UP VISIT: CPT | Mod: S$GLB,,, | Performed by: SURGERY

## 2025-04-28 PROCEDURE — 3079F DIAST BP 80-89 MM HG: CPT | Mod: CPTII,S$GLB,, | Performed by: SURGERY

## 2025-04-28 PROCEDURE — 99999 PR PBB SHADOW E&M-EST. PATIENT-LVL III: CPT | Mod: PBBFAC,,, | Performed by: SURGERY

## 2025-04-28 PROCEDURE — 1160F RVW MEDS BY RX/DR IN RCRD: CPT | Mod: CPTII,S$GLB,, | Performed by: SURGERY

## 2025-04-28 PROCEDURE — 3074F SYST BP LT 130 MM HG: CPT | Mod: CPTII,S$GLB,, | Performed by: SURGERY

## 2025-04-28 NOTE — PROGRESS NOTES
"Postoperative Endocrine Surgery Clinic Note    Reason for visit / Chief complaint: Postoperative evaluation  Endocrinologist: Bret    Subjective:       Randi Khan presents to the clinic approximately 2 weeks following subtotal parathyroidectomy(left inferior parathyroid gland remains in situ).    Procedure:    Parathyroidectomy on 4/15/2025    Operative findings:   1) Enlarged right inferior parathyroid gland found in posteriorly and removed. 720 mg and measurements are 15 mm x 11 mm x 4 mm.   2) Left inferior parathyroid remains in situ.    Right superior and left superior parathyroid glands removed (in addition to the right inferior gland)    Discharge medications:  - Calcium carbonate: 1000mg BID      Eating a regular diet without difficulty. Bowel movements are Normal.  The patient is not having any pain. Patient admits no severe issue currently. Patient denies trouble swallowing, trouble speaking, voice changes, and fingers, toes, and perioral numbness or tingling.   She is currently on Calcium 1000 mg daily. She is not taking Rocaltrol.    Current Medications[1]  Review of patient's allergies indicates:  No Known Allergies    Objective:      /82   Ht 5' 7" (1.702 m)   Wt 108 kg (238 lb 3.3 oz)   BMI 37.31 kg/m²     General:   alert, appears stated age, and cooperative.    Incision:   well approximated, healing well, no signs of drainage, no erythema, no dehiscence, no hematoma, no ecchymosis, no seroma, and swelling   Phonation:  normal       Labs:  Lab Results   Component Value Date    CALCIUM 10.8 (H) 02/24/2025    ALBUMIN 4.5 02/24/2025    PHOS 2.9 12/29/2023    [id number] 36 10/07/2024       Final Diagnosis   Part 1   Question parathyroid, right inferior, biopsy:   Hypercellular parathyroid, consistent with an adenoma     Part 2   Question parathyroid, right superior, biopsy:   Hypercellular parathyroid      Part 3   Question parathyroid, left superior, biopsy:   Hypercellular " parathyroid     Part 4   Question parathyroid, left inferior, biopsy:   Normocellular parathyroid     Part 5  Parathyroid, right superior, parathyroidectomy:   Hypercellular parathyroid, consistent with adenoma     Part 6   Parathyroid, left superior, parathyroidectomy:   Hypercellular parathyroid, consistent with adenoma             Assessment:     Randi Khan is doing well post-operatively after subtotal parathyroidectomy (left inferior remains in situ). Doing well postoperatively for hyperparathyroidism.  Voice adequate.  No hypocalcemia symptoms.        Plan:        Continue any current medications. Will follow-up serum calcium level in order to wean supplementation.  Wound care and scar massage discussed.  Handout provided.  Pathology reveiwed.  Pt is to increase activities as tolerated.  Serum calcium and PTH testing is recommended at 6 and 12 months following parathyroidectomy for long term follow-up. Curative parathyroidectomy is defined as normocalcemia for 6 months after operation. In up to 40% of patients, an elevated PTH level has been observed post-operatively despite a normal calcium level. Obtain labs in six months to document cure of primary hyperparathyroidism with RFP, PTH.  Recommended daily dietary calcium intake equal to about 4583-8081 mg  Recommend 0476-8543 IU vitamin D3 supplementation daily, available over the counter.       Yolande Cardozo MD, FACS  Staff Surgeon  Endocrine Surgery  4/28/25                                  [1]   Current Outpatient Medications   Medication Sig Dispense Refill    acetaminophen (TYLENOL) 325 MG tablet Take 2 tablets (650 mg total) by mouth every 6 (six) hours as needed for Pain. Take at 0600, 1200, 1800, 0000(Midnight). For 3 days      allopurinoL (ZYLOPRIM) 100 MG tablet Take 1 tablet (100 mg total) by mouth once daily. 90 tablet 1    amLODIPine (NORVASC) 5 MG tablet Take 1 tablet (5 mg total) by mouth once daily. 90 tablet 1    calcium carbonate  400 mg calcium (1,000 mg) Chew Take 1000mg calcium twice daily(Tums Ultra).      COLLAGEN MISC by Misc.(Non-Drug; Combo Route) route Daily.      docusate sodium (COLACE) 100 MG capsule Take 1 capsule (100 mg total) by mouth 2 (two) times daily. Take in conjunction with Calcium and narcotic pain medication.    May substitute any OTC stool softener.      fluticasone propionate (FLONASE) 50 mcg/actuation nasal spray 1 spray (50 mcg total) by Each Nostril route once daily. 16 g 2    furosemide (LASIX) 20 MG tablet Take 1/2 pill by mouth daily 30 tablet 5    losartan (COZAAR) 50 MG tablet Take 1 tablet (50 mg total) by mouth once daily. 90 tablet 1    promethazine-dextromethorphan (PROMETHAZINE-DM) 6.25-15 mg/5 mL Syrp Take 5 mLs by mouth every 4 to 6 hours as needed (cough and congestion). 220 mL 0    TURMERIC ORAL Take by mouth Daily.      UNABLE TO FIND Daily. Mushroom supplement      Vit C/E/B6/FA/B12/Argin/Pep Xt (CARDIOTEK, BIOPERINE, ORAL) Take by mouth once daily.       No current facility-administered medications for this visit.

## 2025-05-08 ENCOUNTER — TELEPHONE (OUTPATIENT)
Dept: SURGERY | Facility: CLINIC | Age: 65
End: 2025-05-08
Payer: COMMERCIAL

## 2025-05-08 DIAGNOSIS — E21.0 PRIMARY HYPERPARATHYROIDISM: Primary | ICD-10-CM

## 2025-05-08 NOTE — TELEPHONE ENCOUNTER
LM on pt VM with direct line to call back about scheduling labs unable to have drawn before appt on 4/28/25.    Pt portal message sent

## 2025-05-20 ENCOUNTER — TELEPHONE (OUTPATIENT)
Dept: SURGERY | Facility: CLINIC | Age: 65
End: 2025-05-20
Payer: COMMERCIAL

## 2025-06-11 ENCOUNTER — TELEPHONE (OUTPATIENT)
Dept: SURGERY | Facility: CLINIC | Age: 65
End: 2025-06-11
Payer: COMMERCIAL

## 2025-06-11 NOTE — TELEPHONE ENCOUNTER
3RD ATTEMPT    LM on pt VM with direct line to call back about scheduling labs unable to have drawn on 4/28/25. Pt had bad IV experience before surgery and was very anxious at PO appt about being stuck to have labs drawn. Pt agreed to go at later date near home. To date attempts to contact pt have been unsuccessful.    Pt portal message sent

## 2025-06-12 ENCOUNTER — TELEPHONE (OUTPATIENT)
Dept: PHARMACY | Facility: CLINIC | Age: 65
End: 2025-06-12
Payer: COMMERCIAL

## 2025-06-12 NOTE — TELEPHONE ENCOUNTER
[facility]/Population Health Chart Review & Patient Outreach Details For Medication Adherence Project    Reason for Outreach Encounter: 3rd Party payor non-compliance report ([employer], [employer], C, etc)  2.  Patient Outreach Method: Reviewed Patient Chart  3.   Medication in question: losartan    LAST FILLED: 3/28/25 for 90 day supply  Hypertension Medications              amLODIPine (NORVASC) 5 MG tablet Take 1 tablet (5 mg total) by mouth once daily.    furosemide (LASIX) 20 MG tablet Take 1/2 pill by mouth daily    losartan (COZAAR) 50 MG tablet Take 1 tablet (50 mg total) by mouth once daily.              4.  Reviewed and or Updates Made To: Patient Chart  5. Outreach Outcomes and/or actions taken: Patient filled medication and is on track to be adherent

## 2025-06-19 ENCOUNTER — TELEPHONE (OUTPATIENT)
Dept: SURGERY | Facility: CLINIC | Age: 65
End: 2025-06-19
Payer: COMMERCIAL

## 2025-06-19 NOTE — TELEPHONE ENCOUNTER
4TH ATTEMPT    LM on pt VM with direct line to call back about scheduling labs unable to have drawn on 4/28/25. Pt had bad IV experience before surgery and was very anxious at PO appt about being stuck to have labs draw. Pt agreed to go at later date near home. To date attempts to contact pt have been unsuccessful.

## 2025-06-27 ENCOUNTER — LAB VISIT (OUTPATIENT)
Dept: LAB | Facility: HOSPITAL | Age: 65
End: 2025-06-27
Attending: SURGERY
Payer: COMMERCIAL

## 2025-06-27 DIAGNOSIS — E21.0 PRIMARY HYPERPARATHYROIDISM: ICD-10-CM

## 2025-06-27 LAB
ALBUMIN SERPL BCP-MCNC: 4.3 G/DL (ref 3.5–5.2)
ANION GAP (OHS): 10 MMOL/L (ref 8–16)
BUN SERPL-MCNC: 13 MG/DL (ref 8–23)
CALCIUM SERPL-MCNC: 9.4 MG/DL (ref 8.7–10.5)
CHLORIDE SERPL-SCNC: 102 MMOL/L (ref 95–110)
CO2 SERPL-SCNC: 26 MMOL/L (ref 23–29)
CREAT SERPL-MCNC: 1 MG/DL (ref 0.5–1.4)
GFR SERPLBLD CREATININE-BSD FMLA CKD-EPI: >60 ML/MIN/1.73/M2
GLUCOSE SERPL-MCNC: 85 MG/DL (ref 70–110)
PHOSPHATE SERPL-MCNC: 3.1 MG/DL (ref 2.7–4.5)
POTASSIUM SERPL-SCNC: 4.4 MMOL/L (ref 3.5–5.1)
SODIUM SERPL-SCNC: 138 MMOL/L (ref 136–145)

## 2025-06-27 PROCEDURE — 36415 COLL VENOUS BLD VENIPUNCTURE: CPT

## 2025-06-27 PROCEDURE — 82040 ASSAY OF SERUM ALBUMIN: CPT

## 2025-07-23 ENCOUNTER — PATIENT OUTREACH (OUTPATIENT)
Dept: PHARMACY | Facility: CLINIC | Age: 65
End: 2025-07-23
Payer: COMMERCIAL

## 2025-07-24 NOTE — TELEPHONE ENCOUNTER
[facility]/Population Health Chart Review & Patient Outreach Details For Medication Adherence Project    Reason for Outreach Encounter: 3rd Party payor non-compliance report ([employer], [employer], C, etc)  2.  Patient Outreach Method: [employer]thart message  3.   Medication in question: losartan    LAST FILLED: 3/28/25 for 90 day supply  Hypertension Medications              amLODIPine (NORVASC) 5 MG tablet Take 1 tablet (5 mg total) by mouth once daily.    furosemide (LASIX) 20 MG tablet Take 1/2 pill by mouth daily    losartan (COZAAR) 50 MG tablet Take 1 tablet (50 mg total) by mouth once daily.              4.  Reviewed and or Updates Made To: Patient Chart  5. Outreach Outcomes and/or actions taken: Sent inquiry to patient: Waiting for response.

## 2025-08-13 DIAGNOSIS — I10 HYPERTENSION, UNSPECIFIED TYPE: ICD-10-CM

## 2025-08-13 DIAGNOSIS — R60.9 EDEMA, UNSPECIFIED TYPE: ICD-10-CM

## 2025-08-13 RX ORDER — FUROSEMIDE 20 MG/1
10 TABLET ORAL
Qty: 30 TABLET | Refills: 2 | Status: SHIPPED | OUTPATIENT
Start: 2025-08-13

## 2025-08-14 ENCOUNTER — TELEPHONE (OUTPATIENT)
Dept: PHARMACY | Facility: CLINIC | Age: 65
End: 2025-08-14
Payer: COMMERCIAL

## 2025-08-14 DIAGNOSIS — I10 HYPERTENSION, UNSPECIFIED TYPE: ICD-10-CM

## 2025-08-14 DIAGNOSIS — E79.0 HYPERURICEMIA: ICD-10-CM

## 2025-08-14 RX ORDER — LOSARTAN POTASSIUM 50 MG/1
50 TABLET ORAL DAILY
Qty: 90 TABLET | Refills: 1 | Status: SHIPPED | OUTPATIENT
Start: 2025-08-14

## 2025-08-14 RX ORDER — ALLOPURINOL 100 MG/1
100 TABLET ORAL DAILY
Qty: 90 TABLET | Refills: 1 | Status: SHIPPED | OUTPATIENT
Start: 2025-08-14

## (undated) DEVICE — GLOVE BIOGEL ECLIPSE SZ 7

## (undated) DEVICE — ELECTRODE REM PLYHSV RETURN 9

## (undated) DEVICE — BLADE SURG STAINLESS STEEL #15

## (undated) DEVICE — PACK UNIV PROCEDURE

## (undated) DEVICE — TOWEL OR DISP STRL BLUE 4/PK

## (undated) DEVICE — SCRUB HIBICLENS 4% CHG 4OZ

## (undated) DEVICE — SYR 10CC LUER LOCK

## (undated) DEVICE — ADHESIVE DERMABOND ADVANCED

## (undated) DEVICE — SUT 5-0 MONO P-3 18IN

## (undated) DEVICE — SUT 3-0 12-18IN SILK

## (undated) DEVICE — CLIP LIGATING HEMOCLP SMALL

## (undated) DEVICE — SUT VICRYL PLUS 3-0 SH 18IN

## (undated) DEVICE — ELECTRODE BLADE INSULATED 1 IN

## (undated) DEVICE — Device

## (undated) DEVICE — TRAY DRY SKIN SCRUB PREP

## (undated) DEVICE — NDL HYPO REG 25G X 1 1/2

## (undated) DEVICE — PROBE PRASS SLIM

## (undated) DEVICE — DRAPE INSTR MAGNETIC 10X16IN

## (undated) DEVICE — SUT 4-0 12-18IN SILK BLACK

## (undated) DEVICE — DRESSING TELFA N ADH 3X8

## (undated) DEVICE — BAG PRESSURE INFUSER 3000CC

## (undated) DEVICE — GOWN ECLIPSE REINF LVL4 TWL LG

## (undated) DEVICE — CLIP LIGATING MEDIUM

## (undated) DEVICE — SPONGE KITTNER 1/4X 5/8 L STRL

## (undated) DEVICE — SPONGE GAUZE 16PLY 4X4

## (undated) DEVICE — DRAPE ORTH SPLIT 77X108IN

## (undated) DEVICE — SOL POVIDONE SCRUB IODINE 4 OZ

## (undated) DEVICE — CONTAINER SPEC OR STRL 4.5OZ

## (undated) DEVICE — SUT PROLENE 5/0 RB-1 36 IN

## (undated) DEVICE — DRAPE STERI INSTRUMENT 1018

## (undated) DEVICE — DRAPE SURG MEDI-SLUSH XL 44X66

## (undated) DEVICE — CORD BIPOLAR 12 FOOT